# Patient Record
Sex: FEMALE | Race: WHITE | Employment: UNEMPLOYED | URBAN - METROPOLITAN AREA
[De-identification: names, ages, dates, MRNs, and addresses within clinical notes are randomized per-mention and may not be internally consistent; named-entity substitution may affect disease eponyms.]

---

## 2022-04-20 ENCOUNTER — HOSPITAL ENCOUNTER (OUTPATIENT)
Dept: PHYSICAL THERAPY | Age: 57
Discharge: HOME | End: 2022-04-20
Payer: MEDICARE

## 2022-04-20 PROCEDURE — 97110 THERAPEUTIC EXERCISES: CPT

## 2022-04-20 PROCEDURE — 97162 PT EVAL MOD COMPLEX 30 MIN: CPT

## 2022-04-20 PROCEDURE — 97140 MANUAL THERAPY 1/> REGIONS: CPT
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
## 2022-04-20 NOTE — PROGRESS NOTES
Request for use of Dry Needling/Intramuscular Manual Therapy  Patient: Abdon Turner     Referral Source: Marshall Soni MD  Diagnosis: neck pain       : 1965  Date of initial visit: 22   Attended visits: 1  Missed Visits: 0    Based on findings from the physical therapy examination and evaluation, the evaluating therapist believes the patient, Abdon Turner  would benefit from including Dry Needling as part of the plan of care. Dry needling is a treatment technique utilized in conjunction with other PT interventions to inactivate myofascial trigger points and the pain and dysfunction they cause. Dry Needling is an advanced procedure that requires additional training including greater than 54 hours of intensive course work. Physical Therapists at [address] [place] [address] are certified through [address] [place] [address] courses for their education and are certified to perform treatments. PROCEDURE:   Solid filament sterile needle (typically 0.3mm/30 gauge) inserted into a trigger point   Repeated movements inactivate the trigger points, taking 30-60 seconds per site   Typically consists of 1 dry needling session per week and a possible second treatment including muscle re-education, flexibility, strengthening and other manual techniques to facilitate the benefits of dry needling     BENEFITS:   Inactivation of trigger points   Decreased pain   Increased muscle length   Improved movement patterns   Restoration of function POTENTIAL RISKS:   Post-needling soreness   Infection   Bruising/bleeding   Penetration of a nerve   Pneumothorax   All treating PTs have been thoroughly educated in avoiding adverse reactions    If you agree with this recommendation, please sign this form and fax it to us at [phone]. If you have questions or concerns regarding dry needling or any other treatment we may be providing, please contact us at [phone].     Thank you for allowing us to assist in the care of your patient. Radha Susan, PT    4/20/2022 11:38 AM     NOTE TO PHYSICIAN:  PLEASE COMPLETE THE ORDERS BELOW AND   FAX TO [facility]: [phone]  If you are unable to process this request in 24 hours please contact our office:   [phone] I have read the above request and AGREE to the recommendation of including dry needling as part of the plan of care. ? I have read the above request and DO NOT AGREE to including dry needling as part of the plan of care.   ? I have read the above report and request that my patient continue therapy with the following changes/special instructions:  ________________________________________________________________________    Physicians signature: _______________________________________________     Date: ______________Time:_______________
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
## 2022-04-26 ENCOUNTER — HOSPITAL ENCOUNTER (OUTPATIENT)
Dept: PHYSICAL THERAPY | Age: 57
Discharge: HOME | End: 2022-04-26
Payer: MEDICARE

## 2022-04-26 PROCEDURE — 97140 MANUAL THERAPY 1/> REGIONS: CPT

## 2022-04-26 PROCEDURE — 97112 NEUROMUSCULAR REEDUCATION: CPT

## 2022-04-26 PROCEDURE — 97110 THERAPEUTIC EXERCISES: CPT

## 2022-04-26 NOTE — PROGRESS NOTES
PT DAILY TREATMENT NOTE - [facility]     Patient Name: Yuliana Castellanos  Date:2022  : 1965  [x]  Patient  Verified  Payor: VA MEDICARE / Plan: VA MEDICARE PART A & B / Product Type: Medicare /    In time:10:50  Out time:11:50  Total Treatment Time (min): 60  Total Timed Codes (min): 45  1:1 Treatment Time ([facility] only): 40   Visit #: 2 of     Treatment Area: neck pain     SUBJECTIVE  Pain Level IN:(0-10 scale): 1/10  Pain Level OUT: (0-10 scale) post treatment: 0/10    Any medication changes, allergies to medications, adverse drug reactions, diagnosis change, or new procedure performed?: [x] No    [] Yes (see summary sheet for update)  Subjective functional status/changes:   [] No changes reported  When I turn my head to the right I get this pain that seems like it travels from my neck and head on the right side to my right low back   OBJECTIVE    Modalities Rationale:     decrease edema, decrease inflammation, decrease pain and increase tissue extensibility to improve patient's ability to increase cervical ROM with less pain    min [] Estim, type/location:                                      []  att     []  unatt     []  w/US     []  w/ice    []  w/heat    min []  Mechanical Traction: type/lbs                   []  pro   []  sup   []  int   []  cont    []  before manual    []  after manual    min []  Ultrasound, settings/location:      min []  Iontophoresis w/ dexamethasone, location:                                               []  take home patch       []  in clinic   12+3 set up min []  Ice     [x]  Heat    location/position:  To cervical and scapula     min []  Vasopneumatic Device, press/temp:     min []  Other:    [] Skin assessment post-treatment (if applicable):    []  intact    []  redness- no adverse reaction     []redness - adverse reaction:        12 min Therapeutic Exercise:  [x] See flow sheet : first follow up visit since initial evaluation - initiated POC per flow sheet US at bedside.      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 Patient is a candidate for cupping and without contraindications at this time. Patient was instructed in the indications/contraindications of cupping technique. The patient was educated in its purpose and risks/benefits. Patient was advised that redness is normal after technique is performed and that redness will disappear typically within one week.  Dynamic cupping technique performed      Objective/Functional Measures with Therapist Assessment Noted with Response to Therapy Session:   first follow up visit since initial evaluation - initiated POC per flow sheet         ASSESSMENT/Changes in Function:  Patient presented with painful and limited cervical rotation to the (R) to the CT junction, performed MET in sitting and DTM and TPR to CT junction, UT, LS and mid border of scap with good relieve of pain with manual release attempted cupping to also assist with muscle restriction and trigger point. Will assess the effects of today's treatment next visit  Patient will continue to benefit from skilled PT services to modify and progress therapeutic interventions, address functional mobility deficits, address ROM deficits, address strength deficits, analyze and address soft tissue restrictions, analyze and cue movement patterns, analyze and modify body mechanics/ergonomics and assess and modify postural abnormalities to attain remaining goals. [x]  See Plan of Care  []  See progress note/recertification  []  See Discharge Summary         Progress towards goals / Updated goals:  Short Term Goals: To be accomplished in 1 weeks:  1. Patient will be independent and compliant with HEP in order to progress toward long term goals. Status at last note/certification: issued and reviewed   Long Term Goals: To be accomplished in 24-36 treatments:  1. Patient will improve FOTO assessment score to 66 pts in order to indicate improved functional abilities. Status at last note/certification: 61 pts  2. Patient will improve cervical Right rotation AROM to at least 60 deg without increased pain in order to improve ease of turning head while driving. Status at last note/certification: 50 deg, painful  3. Patient will improve bilateral scapular strength to 5/5 in order to improve stability with recreational activities and work tasks. Status at last note/certification: 4-/5 bilaterally  4. Patient will report worst neck/Right shoulder pain as 4/10 or less in order to improve quality of sleep. Status at last note/certification: 60/53  5. Patient will report overall at least 65% improvement in functional abilities in order to progress toward premorbid activities.   Status at last note/certification: n/a    PLAN  [x]  Upgrade activities as tolerated [x]  Continue plan of care  []  Update interventions per flow sheet       []  Discharge due to:_  []  Other:_      Sonia Thakkar, OLIVIA 4/26/2022  10:54 AM    No future appointments.

## 2022-05-10 ENCOUNTER — HOSPITAL ENCOUNTER (OUTPATIENT)
Dept: PHYSICAL THERAPY | Age: 57
Discharge: HOME | End: 2022-05-10
Payer: MEDICARE

## 2022-05-10 PROCEDURE — 97140 MANUAL THERAPY 1/> REGIONS: CPT

## 2022-05-10 PROCEDURE — 97112 NEUROMUSCULAR REEDUCATION: CPT

## 2022-05-10 PROCEDURE — 97110 THERAPEUTIC EXERCISES: CPT

## 2022-05-10 NOTE — PROGRESS NOTES
PT DAILY TREATMENT NOTE - [facility]     Patient Name: Shad Dobson  Date:5/10/2022  : 1965  [x]  Patient  Verified  Payor: [place] MEDICARE / Plan: [place] MEDICARE PART A & B / Product Type: Medicare /    In time:9:15  Out time:10:20  Total Treatment Time (min): 65  Total Timed Codes (min): 52  1:1 Treatment Time ( [address] only): 52  Visit #: 3 of     Treatment Area: Neck pain [M54.2]  Pain in right shoulder [M25.511]    SUBJECTIVE  Pain Level IN:(0-10 scale): 2-3/10  Pain Level OUT: (0-10 scale) post treatment: 10    Any medication changes, allergies to medications, adverse drug reactions, diagnosis change, or new procedure performed?: [x] No    [] Yes (see summary sheet for update)  Subjective functional status/changes:   [] No changes reported  I did good with my hiking in [place], hiked a total of 30 miles and I had wear a back pack that weighed 48 pounds for 8 hours for 2 days and I think with the pressure of the back pack and you cupping it made it feel better   OBJECTIVE    Modalities Rationale:     decrease edema, decrease inflammation, decrease pain and increase tissue extensibility to improve patient's ability to increase cervical ROM with less pain    min [] Estim, type/location:                                      []  att     []  unatt     []  w/US     []  w/ice    []  w/heat    min []  Mechanical Traction: type/lbs                   []  pro   []  sup   []  int   []  cont    []  before manual    []  after manual    min []  Ultrasound, settings/location:      min []  Iontophoresis w/ dexamethasone, location:                                               []  take home patch       []  in clinic   10+3 set up min []  Ice     [x]  Heat    location/position:  To cervical and scapula     min []  Vasopneumatic Device, press/temp:     min []  Other:    [] Skin assessment post-treatment (if applicable):    []  intact    []  redness- no adverse reaction     []redness - adverse reaction:        12 min Therapeutic Exercise:  [x] See flow sheet :   Prone chin tuck with retraction  Added cervical rotation self stretch with use of towel   Rationale: increase ROM and increase strength to improve the patients ability to achieve all goals stated below      min Therapeutic Activity:  [x]  See flow sheet :   Rationale: increase ROM and increase strength  to improve the patients ability to drive car with improved cervical ROM to check blind spots      20 min Neuromuscular Re-education:  [x]  See flow sheet :  Scab stab with green t-band   Prone M, T and single arm Y   Prone single arm row with ER      Rationale: increase ROM and increase strength  to improve the patients ability to perform all daily activities and 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 Patient is a candidate for cupping and without contraindications at this time. Patient was instructed in the indications/contraindications of cupping technique. The patient was educated in its purpose and risks/benefits. Patient was advised that redness is normal after technique is performed and that redness will disappear typically within one week. Dynamic cupping technique performed      Objective/Functional Measures with Therapist Assessment Noted with Response to Therapy Session:    Added :Prone chin tuck with retraction   cervical rotation self stretch with use of towel  AROM with rotation after manual 72 (R) and 60 (L)     2. Patient will improve cervical Right rotation AROM to at least 60 deg without increased pain in order to improve ease of turning head while driving. Status at last note/certification: AROM with rotation after manual 72 (R) and 60 (L)  5/10/2022    ASSESSMENT/Changes in Function:  Patient responsed well with MET to correct extension and flexion rotation lesion to the (R) cervical spine with increase active (R) rotation   Patient will continue to benefit from skilled PT services to modify and progress therapeutic interventions, address functional mobility deficits, address ROM deficits, address strength deficits, analyze and address soft tissue restrictions, analyze and cue movement patterns, analyze and modify body mechanics/ergonomics and assess and modify postural abnormalities to attain remaining goals. [x]  See Plan of Care  []  See progress note/recertification  []  See Discharge Summary         Progress towards goals / Updated goals:  Short Term Goals: To be accomplished in 1 weeks:  1. Patient will be independent and compliant with HEP in order to progress toward long term goals. Status at last note/certification: issued and reviewed   Long Term Goals: To be accomplished in 24-36 treatments:  1. Patient will improve FOTO assessment score to 66 pts in order to indicate improved functional abilities. Status at last note/certification: 61 pts  2. Patient will improve cervical Right rotation AROM to at least 60 deg without increased pain in order to improve ease of turning head while driving. Status at last note/certification: AROM with rotation after manual 72 (R) and 60 (L)  5/10/2022  3. Patient will improve bilateral scapular strength to 5/5 in order to improve stability with recreational activities and work tasks. Status at last note/certification: 4-/5 bilaterally  4. Patient will report worst neck/Right shoulder pain as 4/10 or less in order to improve quality of sleep.   Status at last note/certification: 70/19  5. Patient will report overall at least 65% improvement in functional abilities in order to progress toward premorbid activities.   Status at last note/certification: n/a    PLAN  [x]  Upgrade activities as tolerated     [x]  Continue plan of care  []  Update interventions per flow sheet       []  Discharge due to:_  []  Other:_      Emeka Zaman PTA 5/10/2022  10:54 AM    Future Appointments   Date Time Provider Aracelis Fulton   5/13/2022 10:45 AM Anat Monteiro PTA [facility]   5/17/2022 10:00 AM Anat Monteiro PTA [facility]   5/19/2022 10:00 AM Anat Monteiro, PTA [facility]

## 2022-05-13 ENCOUNTER — HOSPITAL ENCOUNTER (OUTPATIENT)
Dept: PHYSICAL THERAPY | Age: 57
Discharge: HOME | End: 2022-05-13
Payer: MEDICARE

## 2022-05-13 PROCEDURE — 97112 NEUROMUSCULAR REEDUCATION: CPT

## 2022-05-13 PROCEDURE — 97110 THERAPEUTIC EXERCISES: CPT

## 2022-05-13 PROCEDURE — 97140 MANUAL THERAPY 1/> REGIONS: CPT

## 2022-05-13 NOTE — PROGRESS NOTES
PT DAILY TREATMENT NOTE - [facility]     Patient Name: Huma Barakat  Date:2022  : 1965  [x]  Patient  Verified  Payor: [place] MEDICARE / Plan: [place] MEDICARE PART A & B / Product Type: Medicare /    In time:10:55 Out time:11:59  Total Treatment Time (min): 64  Total Timed Codes (min): 54  1:1 Treatment Time ( [address] only): 52  Visit #: 4 of     Treatment Area: Neck pain [M54.2]  Pain in right shoulder [M25.511]    SUBJECTIVE  Pain Level IN:(0-10 scale): 3/10  Pain Level OUT: (0-10 scale) post treatment: 0/10    Any medication changes, allergies to medications, adverse drug reactions, diagnosis change, or new procedure performed?: [x] No    [] Yes (see summary sheet for update)  Subjective functional status/changes:   [] No changes reported  I have a really bad migraine today it started 2 days ago so that makes my neck tighten up     OBJECTIVE    Modalities Rationale:     decrease edema, decrease inflammation, decrease pain and increase tissue extensibility to improve patient's ability to increase cervical ROM with less pain    min [] Estim, type/location:                                      []  att     []  unatt     []  w/US     []  w/ice    []  w/heat    min []  Mechanical Traction: type/lbs                   []  pro   []  sup   []  int   []  cont    []  before manual    []  after manual    min []  Ultrasound, settings/location:      min []  Iontophoresis w/ dexamethasone, location:                                               []  take home patch       []  in clinic   10 min []  Ice     [x]  Heat    location/position:  To cervical and scapula     min []  Vasopneumatic Device, press/temp:     min []  Other:    [] Skin assessment post-treatment (if applicable):    []  intact    []  redness- no adverse reaction     []redness - adverse reaction:        14 min Therapeutic Exercise:  [x] See flow sheet :   Prone chin tuck with retraction  Door way stretch  UBE   Rationale: increase ROM and increase strength to improve the patients ability to achieve all goals stated below      min Therapeutic Activity:  [x]  See flow sheet :   Rationale: increase ROM and increase strength  to improve the patients ability to drive car with improved cervical ROM to check blind spots      20 min Neuromuscular Re-education:  [x]  See flow sheet :  Scab stab with green t-band   Prone M, T and single arm Y   Prone single arm row with ER      Rationale: increase ROM and increase strength  to improve the patients ability to perform all daily activities and 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 Patient is a candidate for cupping and without contraindications at this time. Patient was instructed in the indications/contraindications of cupping technique. The patient was educated in its purpose and risks/benefits. Patient was advised that redness is normal after technique is performed and that redness will disappear typically within one week. Dynamic cupping technique performed      Objective/Functional Measures with Therapist Assessment Noted with Response to Therapy Session:  Did not progress with exercise program today  secondary to patient arrived late and patient report a migraine      ASSESSMENT/Changes in Function:   Patient presented with painful rotation lesion to (L) side of c-spine today compare to the (R) side from C2-6.  Was able to correct with MET for extension and flexion lesion. Patient responded well to active release to the (R) UT/SCM/scalenus with improved (L) cervical rotation. Patient will continue to benefit from skilled PT services to modify and progress therapeutic interventions, address functional mobility deficits, address ROM deficits, address strength deficits, analyze and address soft tissue restrictions, analyze and cue movement patterns, analyze and modify body mechanics/ergonomics and assess and modify postural abnormalities to attain remaining goals. [x]  See Plan of Care  []  See progress note/recertification  []  See Discharge Summary         Progress towards goals / Updated goals:  Short Term Goals: To be accomplished in 1 weeks:  1. Patient will be independent and compliant with HEP in order to progress toward long term goals. Status at last note/certification: issued and reviewed   Long Term Goals: To be accomplished in 24-36 treatments:  1. Patient will improve FOTO assessment score to 66 pts in order to indicate improved functional abilities. Status at last note/certification: 61 pts  2. Patient will improve cervical Right rotation AROM to at least 60 deg without increased pain in order to improve ease of turning head while driving. Status at last note/certification: AROM with rotation after manual 72 (R) and 60 (L)  5/10/2022  3. Patient will improve bilateral scapular strength to 5/5 in order to improve stability with recreational activities and work tasks. Status at last note/certification: 4-/5 bilaterally  4. Patient will report worst neck/Right shoulder pain as 4/10 or less in order to improve quality of sleep. Status at last note/certification: 04/40  5. Patient will report overall at least 65% improvement in functional abilities in order to progress toward premorbid activities.   Status at last note/certification: n/a    PLAN  [x]  Upgrade activities as tolerated     [x]  Continue plan of care  []  Update interventions per flow sheet []  Discharge due to:_  []  Other:_      Teri Medrano PTA 5/13/2022  10:54 AM    Future Appointments   Date Time Provider Aracelis Fulton   5/17/2022 10:00 AM Charlott Miser [facility]   5/19/2022 10:00 AM Carie Clemens PTA [facility]

## 2022-05-17 ENCOUNTER — HOSPITAL ENCOUNTER (OUTPATIENT)
Dept: PHYSICAL THERAPY | Age: 57
Discharge: HOME | End: 2022-05-17
Payer: MEDICARE

## 2022-05-17 PROCEDURE — 97110 THERAPEUTIC EXERCISES: CPT

## 2022-05-17 PROCEDURE — 97112 NEUROMUSCULAR REEDUCATION: CPT

## 2022-05-17 PROCEDURE — 97140 MANUAL THERAPY 1/> REGIONS: CPT

## 2022-05-17 NOTE — PROGRESS NOTES
PT DAILY TREATMENT NOTE - [facility]     Patient Name: Jeannie Johnston  Date:2022  : 1965  [x]  Patient  Verified  Payor: [place] MEDICARE / Plan: [place] MEDICARE PART A & B / Product Type: Medicare /    In time:10:02 Out time:11:13  Total Treatment Time (min): 71  Total Timed Codes (min): 61  1:1 Treatment Time ( [address] only): 64  Visit #: 5 of     Treatment Area: Neck pain [M54.2]  Pain in right shoulder [M25.511]    SUBJECTIVE  Pain Level IN:(0-10 scale): 4-5/10  Pain Level OUT: (0-10 scale) post treatment: 0/10    Any medication changes, allergies to medications, adverse drug reactions, diagnosis change, or new procedure performed?: [x] No    [] Yes (see summary sheet for update)  Subjective functional status/changes:   [] No changes reported  When I am keep my neck still no pain but when I side bend left and right I feel pull on the opposite side of the motion     OBJECTIVE    Modalities Rationale:     decrease edema, decrease inflammation, decrease pain and increase tissue extensibility to improve patient's ability to increase cervical ROM with less pain    min [] Estim, type/location:                                      []  att     []  unatt     []  w/US     []  w/ice    []  w/heat    min []  Mechanical Traction: type/lbs                   []  pro   []  sup   []  int   []  cont    []  before manual    []  after manual    min []  Ultrasound, settings/location:      min []  Iontophoresis w/ dexamethasone, location:                                               []  take home patch       []  in clinic   10 min []  Ice     [x]  Heat    location/position:  To cervical and scapula     min []  Vasopneumatic Device, press/temp:     min []  Other:    [] Skin assessment post-treatment (if applicable):    []  intact    []  redness- no adverse reaction     []redness - adverse reaction:        21 min Therapeutic Exercise:  [x] See flow sheet :   Prone chin tuck with retraction  Door way stretch  UBE  full cans with #3   Rationale: increase ROM and increase strength to improve the patients ability to achieve all goals stated below      min Therapeutic Activity:  [x]  See flow sheet :   Rationale: increase ROM and increase strength  to improve the patients ability to drive car with improved cervical ROM to check blind spots      20 min Neuromuscular Re-education:  [x]  See flow sheet :  Scab stab with green t-band   Prone M, T and single arm Y   Prone single arm row with ER  wall \"v\" with #2  Added Open book against wall with green t-band       Rationale: increase ROM and increase strength  to improve the patients ability to perform all daily activities and 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 Patient is a candidate for cupping and without contraindications at this time. Patient was instructed in the indications/contraindications of cupping technique. The patient was educated in its purpose and risks/benefits. Patient was advised that redness is normal after technique is performed and that redness will disappear typically within one week. Dynamic cupping technique performed      Objective/Functional Measures with Therapist Assessment Noted with Response to Therapy Session:  Added full cans with #3  Added wall \"v\" with #2  Added Open book against wall with green t-band     GOALS  5.  Patient will report overall at least 65% improvement in functional abilities in order to progress toward premorbid activities. Status at last note/certification: patient reports 35% improvement 5/17/2022    ASSESSMENT/Changes in Function:  Patient reports with side bending to the (L) - increase pulling to the (R) cervical and upper trap area and with side bending to the (R) with increase pain to the cervical spine and into the head to the (L) side   After manual and active release to both sides patient was able to perform active cervical side bending with less pulling and pain report, visible increase motion noted as well   Patient will continue to benefit from skilled PT services to modify and progress therapeutic interventions, address functional mobility deficits, address ROM deficits, address strength deficits, analyze and address soft tissue restrictions, analyze and cue movement patterns, analyze and modify body mechanics/ergonomics and assess and modify postural abnormalities to attain remaining goals. [x]  See Plan of Care  []  See progress note/recertification  []  See Discharge Summary         Progress towards goals / Updated goals:  Short Term Goals: To be accomplished in 1 weeks:  1. Patient will be independent and compliant with HEP in order to progress toward long term goals. Status at last note/certification: issued and reviewed   Long Term Goals: To be accomplished in 24-36 treatments:  1. Patient will improve FOTO assessment score to 66 pts in order to indicate improved functional abilities. Status at last note/certification: 61 pts  2. Patient will improve cervical Right rotation AROM to at least 60 deg without increased pain in order to improve ease of turning head while driving. Status at last note/certification: AROM with rotation after manual 72 (R) and 60 (L)  5/10/2022  3. Patient will improve bilateral scapular strength to 5/5 in order to improve stability with recreational activities and work tasks.   Status at last note/certification: 4-/5 bilaterally  4. Patient will report worst neck/Right shoulder pain as 4/10 or less in order to improve quality of sleep. Status at last note/certification: 20/46  5. Patient will report overall at least 65% improvement in functional abilities in order to progress toward premorbid activities.   Status at last note/certification: patient reports 35% improvement 5/17/2022    PLAN  [x]  Upgrade activities as tolerated     [x]  Continue plan of care  []  Update interventions per flow sheet       []  Discharge due to:_  []  Other:_      Alma Delia Jones PTA 5/17/2022  10:54 AM    Future Appointments   Date Time Provider Aracelis Fulton   5/19/2022 10:00 AM Bereket Holcomb PTA [facility]

## 2022-05-19 ENCOUNTER — HOSPITAL ENCOUNTER (OUTPATIENT)
Dept: PHYSICAL THERAPY | Age: 57
Discharge: HOME | End: 2022-05-19
Payer: MEDICARE

## 2022-05-19 PROCEDURE — 97110 THERAPEUTIC EXERCISES: CPT

## 2022-05-19 PROCEDURE — 97112 NEUROMUSCULAR REEDUCATION: CPT

## 2022-05-19 PROCEDURE — 97140 MANUAL THERAPY 1/> REGIONS: CPT

## 2022-05-19 NOTE — PROGRESS NOTES
PT DAILY TREATMENT NOTE - [facility]     Patient Name: Abdon Turner  Date:2022  : 1965  [x]  Patient  Verified  Payor: [place] MEDICARE / Plan: [place] MEDICARE PART A & B / Product Type: Medicare /    In time:10:00 Out time:11:14  Total Treatment Time (min): 74  Total Timed Codes (min): 59  1:1 Treatment Time ( [address] only): 46  Visit #: 6 of     Treatment Area: Neck pain [M54.2]  Pain in right shoulder [M25.511]    SUBJECTIVE  Pain Level IN:(0-10 scale): 610  Pain Level OUT: (0-10 scale) post treatment: 3/10    Any medication changes, allergies to medications, adverse drug reactions, diagnosis change, or new procedure performed?: [x] No    [] Yes (see summary sheet for update)  Subjective functional status/changes:   [] No changes reported  I am really sore and in pain after I have therapy     OBJECTIVE    Modalities Rationale:     decrease edema, decrease inflammation, decrease pain and increase tissue extensibility to improve patient's ability to increase cervical ROM with less pain    min [] Estim, type/location:                                      []  att     []  unatt     []  w/US     []  w/ice    []  w/heat    min []  Mechanical Traction: type/lbs                   []  pro   []  sup   []  int   []  cont    []  before manual    []  after manual    min []  Ultrasound, settings/location:      min []  Iontophoresis w/ dexamethasone, location:                                               []  take home patch       []  in clinic   12+3 setup min []  Ice     [x]  Heat    location/position:  To cervical and scapula     min []  Vasopneumatic Device, press/temp:     min []  Other:    [] Skin assessment post-treatment (if applicable):    []  intact    []  redness- no adverse reaction     []redness - adverse reaction:        19 min Therapeutic Exercise:  [x] See flow sheet :   Prone chin tuck with retraction  Door way stretch  UBE  full cans with #3  Added arnold press with #2   Rationale: increase ROM and increase strength to improve the patients ability to achieve all goals stated below      min Therapeutic Activity:  [x]  See flow sheet :   Rationale: increase ROM and increase strength  to improve the patients ability to drive car with improved cervical ROM to check blind spots      20 min Neuromuscular Re-education:  [x]  See flow sheet :  Scab stab with green t-band   Prone M, T and single arm Y   Prone single arm row with ER  wall \"v\" with #2  Open book against wall with green t-band  Added wall push up with orange swiss ball        Rationale: increase ROM and increase strength  to improve the patients ability to perform all daily activities and 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     20 min Manual Therapy: In sitting active release to the (L) UT/SCM/scalenus and in supine (R) active release   In supine MET for (B) extension and flexion rotation lesion to C2-6   SOR     Rationale: decrease pain, increase ROM, increase tissue extensibility, decrease trigger points and increase postural awareness to cervical and right shoulder               With   [] TE   [] TA   [] neuro   [] other: Patient Education: [x] Review HEP    [] Progressed/Changed HEP based on:   [] positioning   [] body mechanics   [] transfers   [] heat/ice application    [] other:  IASTM performed. Patient is a candidate for cupping and without contraindications at this time. Patient was instructed in the indications/contraindications of cupping technique. The patient was educated in its purpose and risks/benefits. Patient was advised that redness is normal after technique is performed and that redness will disappear typically within one week.  Dynamic cupping technique performed      Objective/Functional Measures with Therapist Assessment Noted with Response to Therapy Session:  Added wall push up with orange swiss ball   Added arnold press with #2      ASSESSMENT/Changes in Function:  Improved mobility noted to the cervical spine with decrease flexion/extension rotation lesion to (L) and (R) side with less pain reported with manual to correct these lesions, decrease tightness to the cervical paraspinals and reported relieve of symptoms with SOR - improved passive cervical rotation noted in supine with only slight tightness stated over the (L) UT area  Patient will continue to benefit from skilled PT services to modify and progress therapeutic interventions, address functional mobility deficits, address ROM deficits, address strength deficits, analyze and address soft tissue restrictions, analyze and cue movement patterns, analyze and modify body mechanics/ergonomics and assess and modify postural abnormalities to attain remaining goals. [x]  See Plan of Care  []  See progress note/recertification  []  See Discharge Summary         Progress towards goals / Updated goals:  Short Term Goals: To be accomplished in 1 weeks:  1. Patient will be independent and compliant with HEP in order to progress toward long term goals. Status at last note/certification: issued and reviewed   Long Term Goals: To be accomplished in 24-36 treatments:  1. Patient will improve FOTO assessment score to 66 pts in order to indicate improved functional abilities. Status at last note/certification: 61 pts  2. Patient will improve cervical Right rotation AROM to at least 60 deg without increased pain in order to improve ease of turning head while driving. Status at last note/certification: AROM with rotation after manual 72 (R) and 60 (L)  5/10/2022  3. Patient will improve bilateral scapular strength to 5/5 in order to improve stability with recreational activities and work tasks. Status at last note/certification: 4-/5 bilaterally  4. Patient will report worst neck/Right shoulder pain as 4/10 or less in order to improve quality of sleep. Status at last note/certification: 74/28  5.  Patient will report overall at least 65% improvement in functional abilities in order to progress toward premorbid activities.   Status at last note/certification: patient reports 35% improvement 5/17/2022    PLAN  [x]  Upgrade activities as tolerated     [x]  Continue plan of care  []  Update interventions per flow sheet       []  Discharge due to:_  []  Other:_      Uri Pond PTA 5/19/2022  10:54 AM    Future Appointments   Date Time Provider Aracelis Fulton   5/23/2022 11:30 AM Jennifer Miller PT [facility]   5/26/2022 10:45 AM Claribel Guzman PTA [facility]   6/3/2022 11:30 AM Claribel Guzman, PTA [facility]   6/7/2022 10:45 AM Beaverdam Mast PTA [facility]

## 2022-05-23 ENCOUNTER — HOSPITAL ENCOUNTER (OUTPATIENT)
Dept: PHYSICAL THERAPY | Age: 57
Discharge: HOME | End: 2022-05-23
Payer: MEDICARE

## 2022-05-23 PROCEDURE — 97140 MANUAL THERAPY 1/> REGIONS: CPT

## 2022-05-23 PROCEDURE — 97110 THERAPEUTIC EXERCISES: CPT
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
## 2022-05-23 NOTE — PROGRESS NOTES
PT DAILY TREATMENT NOTE     Patient Name: Marcio Meier  Date:2022  : 1965  [x]  Patient  Verified  Payor: VA MEDICARE / Plan: VA MEDICARE PART A & B / Product Type: Medicare /    In [id number]  Out time:1229  Total Treatment Time (min): 57  Visit #: 7 of     Medicare/BCBS Only   Total Timed Codes (min):  57 1:1 Treatment Time:  57       Treatment Area: Neck pain [M54.2]  Pain in right shoulder [M25.511]    SUBJECTIVE  Pain Level (0-10 scale): 1-2/10  Any medication changes, allergies to medications, adverse drug reactions, diagnosis change, or new procedure performed?: [x] No    [] Yes (see summary sheet for update)  Subjective functional status/changes:   [] No changes reported  I'm feeling pretty good. I've changed the way I've been working out, [employer] using the [employer] at home instead of [facility].      OBJECTIVE    Modality rationale: decrease pain and increase tissue extensibility to improve the patients ability to improve cervical mobility and reduce soreness after exercises/manual    Min Type Additional Details    [] Estim:  []Unatt       []IFC  []Premod                        []Other:  []w/ice   []w/heat  Position:  Location:    [] Estim: []Att    []TENS instruct  []NMES                    []Other:  []w/US   []w/ice   []w/heat  Position:  Location:    []  Traction: [] Cervical       []Lumbar                       [] Prone          []Supine                       []Intermittent   []Continuous Lbs:  [] before manual  [] after manual    []  Ultrasound: []Continuous   [] Pulsed                           []1MHz   []3MHz W/cm2:  Location:    []  Iontophoresis with dexamethasone         Location: [] Take home patch   [] In clinic   PD []  Ice     []  heat  []  Ice massage  []  Laser   []  Anodyne Position:  Location:    []  Laser with stim  []  Other:  Position:  Location:    []  Vasopneumatic Device    []  Right     []  Left  Pre-treatment girth:  Post-treatment girth:  Measured at (location):  Pressure:       [] lo [] med [] hi   Temperature: [] lo [] med [] hi   [] Skin assessment post-treatment:  []intact []redness- no adverse reaction    []redness - adverse reaction:     Vasopnuematic compression justification:  Per bilateral girth measures taken and listed above the edema is considered significant and having an impact on the patient's strength and gait/posture     32 min Therapeutic Exercise:  [] See flow sheet :  FOTO  Reassessment  Goal reassessment    Hold:   Prone chin tuck with retraction  Door way stretch  UBE  full cans with #3  Added arnold press with #2   Rationale: increase ROM and increase strength to improve the patients ability to perform ADLs, job tasks     x min Therapeutic Activity:  [x]  See flow sheet :   Rationale: increase ROM and increase strength  to improve the patients ability to drive car with improved cervical ROM to check blind spots     hold min Neuromuscular Re-education:  [x]  See flow sheet :  Scab stab with green t-band   Prone M, T and single arm Y   Prone single arm row with ER  wall \"v\" with #2  Open book against wall with green t-band  Added wall push up with orange swiss ball     Rationale: increase ROM, increase strength, improve coordination and increase proprioception  to improve the patients ability to improve cervical, scapular stability with functional tasks     25 min Manual Therapy:  Prone STM/TPR to bilateral UT, prone STM/TPR to bilateral proximal SCM, supine manual cervical distraction with stretching into rotation/side bending, supine STM to bilateral proximal SCMs    The manual therapy interventions were performed at a separate and distinct time from the therapeutic activities interventions.   Rationale: decrease pain, increase ROM, increase tissue extensibility and decrease trigger points to improve ease of turning head to drive, improve postural awareness with daily tasks      With   [] TE   [] TA   [] neuro   [] other: Patient Education: [x] Review HEP    [] Progressed/Changed HEP based on:   [] positioning   [] body mechanics   [] transfers   [] heat/ice application    [] other:      Other Objective/Functional Measures: seated cervical Rotation AROM: Right 62 deg, Left 56 deg  Prone scapular strength: Left grossly 4-/5, Right middle trapezius 5/5 and lower trapezius 4-/5   Functional Gains: improving ergonomics with painting, more awareness of posture, less severity of pain, less tenderness in the cervical musculature, improving cervical mobility   Functional Deficits: increased neck pain with holding head up with workouts, increased pain with turning head to Left and Right while driving (had to using fingers to put pressure at proximal SCM to improve mobility), increased tightness/stiffness in the mornings, more difficulty turning head to the Left, tension/knot Right UT, scapular strength  % improvement: 70-75%  Pain   Average: 2-3/10       Best: 1/10     Worst: 4/10  Patient Goal: \"learn some better techniques for holding my head up\"    Pain Level (0-10 scale) post treatment: 1/10    ASSESSMENT/Changes in Function: Patient has attended therapy over the past month for neck and Right shoulder pain due to a prior injury, with reports of improving overall muscle flexibility but continued pain and tightness. Patient finds that as she turns her head to either side (such as with driving), she has to put pressure on her neck to reduce tension. She has adjusted her workout style to limit cervical strain, and does note improvements in overall pain levels since eliminating Pilates activities that caused more discomfort. Patient does still have trigger points and pain in the UTs and SCMs, and notes some increased strain with Left cervical rotation whereas a month ago Right rotation was more challenging. Scapular strength remains impaired.  Patient would benefit from continued skilled physical therapy in order to continued to improve cervicoscapular mobility and stability. Patient will continue to benefit from skilled PT services to modify and progress therapeutic interventions, address functional mobility deficits, address ROM deficits, address strength deficits, analyze and address soft tissue restrictions, analyze and cue movement patterns, analyze and modify body mechanics/ergonomics, assess and modify postural abnormalities, address imbalance/dizziness and instruct in home and community integration to attain remaining goals. []  See Plan of Care  []  See progress note/recertification  []  See Discharge Summary         Progress towards goals / Updated goals:  Short Term Goals: To be accomplished in 1 weeks:  1. Patient will be independent and compliant with HEP in order to progress toward long term goals. Status at last note/certification: issued and reviewed   Current: progressing, some compliance 5/23/22  Long Term Goals: To be accomplished in 24-36 treatments:  1. Patient will improve FOTO assessment score to 66 pts in order to indicate improved functional abilities. Status at last note/certification: 61 pts  Current: met, 72 pts 5/23/22  2. Patient will improve cervical Right rotation AROM to at least 60 deg without increased pain in order to improve ease of turning head while driving. Status at last note/certification: 50 deg, painful    Current: progressing, Right 62 deg, Left 56 deg 5/23/22  3. Patient will improve bilateral scapular strength to 5/5 in order to improve stability with recreational activities and work tasks. Status at last note/certification: 4-/5 bilaterally  Current: progressing, Left remains grossly 4-/5, Right MT 5/5 LT 4-/5 5/23/22  4. Patient will report worst neck/Right shoulder pain as 4/10 or less in order to improve quality of sleep. Status at last note/certification: 48/80  Current: met as of this past week, 4/10 5/23/22  5.  Patient will report overall at least 65% improvement in functional abilities in order to progress toward premorbid activities.   Status at last note/certification: n/a  Current: met, 75% 5/23/22    PLAN  [x]  Upgrade activities as tolerated     [x]  Continue plan of care  [x]  Update interventions per flow sheet       []  Discharge due to:_  []  Other:_      Dania Villareal, PT 5/23/2022  10:51 AM    Future Appointments   Date Time Provider Aracelis Fulton   5/23/2022 11:30 AM Jeffry Angel, PT [place] [id number] [address]   5/26/2022 10:45 AM Bereket Holcomb PTA [place] [id number]   6/3/2022 11:30 AM Bereket Holcomb PTA [place] [id number] [address]   6/7/2022 10:45 AM Bereket Holcomb [facility] [address]

## 2022-05-26 ENCOUNTER — APPOINTMENT (OUTPATIENT)
Dept: PHYSICAL THERAPY | Age: 57
End: 2022-05-26
Payer: MEDICARE

## 2022-06-03 ENCOUNTER — HOSPITAL ENCOUNTER (OUTPATIENT)
Dept: PHYSICAL THERAPY | Age: 57
Discharge: HOME | End: 2022-06-03
Payer: MEDICARE

## 2022-06-03 PROCEDURE — 97112 NEUROMUSCULAR REEDUCATION: CPT

## 2022-06-03 PROCEDURE — 97140 MANUAL THERAPY 1/> REGIONS: CPT

## 2022-06-03 NOTE — PROGRESS NOTES
PT DAILY TREATMENT NOTE - [facility]     Patient Name: Yuliana Castellanos  Date:6/3/2022  : 1965  [x]  Patient  Verified  Payor: [place] MED[place] / Plan: [place] MEDICARE PART A & B / Product Type: Medicare /    In time:10:54 Out time:11:48  Total Treatment Time (min): 54  Total Timed Codes (min): 44  1:1 Treatment Time ( [address] only): 44  Visit #: 8 of     Treatment Area: Neck pain [M54.2]  Pain in right shoulder [M25.511]    SUBJECTIVE  Pain Level IN:(0-10 scale): 2/10  Pain Level OUT: (0-10 scale) post treatment: 0/10    Any medication changes, allergies to medications, adverse drug reactions, diagnosis change, or new procedure performed?: [x] No    [] Yes (see summary sheet for update)  Subjective functional status/changes:   [] No changes reported  It is really in between my shoulder blades and right in the center of my lower neck - I can move my head more and I can hear popping and cracking but it doesn't hurt or anything like it did     OBJECTIVE    Modalities Rationale:     decrease edema, decrease inflammation, decrease pain and increase tissue extensibility to improve patient's ability to increase cervical ROM with less pain    min [] Estim, type/location:                                      []  att     []  unatt     []  w/US     []  w/ice    []  w/heat    min []  Mechanical Traction: type/lbs                   []  pro   []  sup   []  int   []  cont    []  before manual    []  after manual    min []  Ultrasound, settings/location:      min []  Iontophoresis w/ dexamethasone, location:                                               []  take home patch       []  in clinic   10 min []  Ice     [x]  Heat    location/position:  To cervical and scapula     min []  Vasopneumatic Device, press/temp:     min []  Other:    [] Skin assessment post-treatment (if applicable):    []  intact    []  redness- no adverse reaction     []redness - adverse reaction:        7 min Therapeutic Exercise:  [x] See flow sheet :   Door way stretch  UBE    TC:  full cans with #3  Added arnold press with #2   Rationale: increase ROM and increase strength to improve the patients ability to achieve all goals stated below      min Therapeutic Activity:  [x]  See flow sheet :   Rationale: increase ROM and increase strength  to improve the patients ability to drive car with improved cervical ROM to check blind spots      12 min Neuromuscular Re-education:  [x]  See flow sheet :  Scab stab with green t-band   Prone M, T and single arm Y   Prone single arm row with ER  wall \"v\" with #2    TC:  Open book against wall with green t-band  Added wall push up with orange swiss ball        Rationale: increase ROM and increase strength  to improve the patients ability to perform all daily activities and 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     25 min Manual Therapy: In prone gentle mobs to the C6 through upper thoracic and TPR and STM to C6-upper thoracic   In supine MET for (B) extension and flexion rotation lesion to CT junction  SOR     Rationale: decrease pain, increase ROM, increase tissue extensibility, decrease trigger points and increase postural awareness to cervical and right shoulder               With   [] TE   [] TA   [] neuro   [] other: Patient Education: [x] Review HEP    [] Progressed/Changed HEP based on:   [] positioning   [] body mechanics   [] transfers   [] heat/ice application    [] other:  IASTM performed. Patient is a candidate for cupping and without contraindications at this time. Patient was instructed in the indications/contraindications of cupping technique. The patient was educated in its purpose and risks/benefits. Patient was advised that redness is normal after technique is performed and that redness will disappear typically within one week.  Dynamic cupping technique performed      Objective/Functional Measures with Therapist Assessment Noted with Response to Therapy Session:  TC with some exercises and activities due to arriving late    GOALS  3. Patient will report worst neck/Right shoulder pain as 3/10 or less in order to improve quality of sleep. Status at last note/certification: MET 1/01 6/3/2022    ASSESSMENT/Changes in Function:  Patient presents with some rotation restriction to the CT junction that is causing some increase discomfort to the (R) side of the CT junction - responded well with MET and manual - improved PROM of head with rotation left and right noted in supine with less report of pain/restriction felt   Patient will continue to benefit from skilled PT services to modify and progress therapeutic interventions, address functional mobility deficits, address ROM deficits, address strength deficits, analyze and address soft tissue restrictions, analyze and cue movement patterns, analyze and modify body mechanics/ergonomics and assess and modify postural abnormalities to attain remaining goals. [x]  See Plan of Care  []  See progress note/recertification  []  See Discharge Summary         Progress towards goals / Updated goals:5/23/22  Updated Goals to be accomplished in remaining 24 to 36 treatments:  1. Patient will improve bilateral cervical rotation AROM to at least 70 deg without increased pain in order to improve ease of turning head while driving. Status at last note/certification: Right 62 deg, Left 56 deg   2. Patient will improve bilateral scapular strength to 5/5 in order to improve stability with recreational activities and work tasks. Status at last note/certification: Left remains grossly 4-/5, Right MT 5/5 LT 4-/5   3. Patient will report worst neck/Right shoulder pain as 3/10 or less in order to improve quality of sleep.   Status at last note/certification: MET 7/43 6/3/2022    PLAN  [x]  Upgrade activities as tolerated     [x]  Continue plan of care  []  Update interventions per flow sheet       []  Discharge due to:_  []  Other:_      Jovan Washington, OLIVIA 6/3/2022  10:54 AM    Future Appointments   Date Time Provider Aracelis Fulton   6/7/2022 10:45 AM Linda Cabrera PTA [facility]

## 2022-06-07 ENCOUNTER — HOSPITAL ENCOUNTER (OUTPATIENT)
Dept: PHYSICAL THERAPY | Age: 57
Discharge: HOME | End: 2022-06-07
Payer: MEDICARE

## 2022-06-07 PROCEDURE — 97110 THERAPEUTIC EXERCISES: CPT

## 2022-06-07 PROCEDURE — 97140 MANUAL THERAPY 1/> REGIONS: CPT

## 2022-06-07 PROCEDURE — 97112 NEUROMUSCULAR REEDUCATION: CPT

## 2022-06-07 NOTE — PROGRESS NOTES
PT DAILY TREATMENT NOTE - [facility]     Patient Name: Drew Healy  Date:2022  : 1965  [x]  Patient  Verified  Payor: [place] MEDICARE / Plan: [place] MEDICARE PART A & B / Product Type: Medicare /    In time:10:50 Out time:11:45  Total Treatment Time (min): 55  Total Timed Codes (min): 45  1:1 Treatment Time ( [address] only): 40  Visit #: 9 of     Treatment Area: Neck pain [M54.2]  Pain in right shoulder [M25.511]    SUBJECTIVE  Pain Level IN:(0-10 scale): 2/10  Pain Level OUT: (0-10 scale) post treatment: 0/10    Any medication changes, allergies to medications, adverse drug reactions, diagnosis change, or new procedure performed?: [x] No    [] Yes (see summary sheet for update)  Subjective functional status/changes:   [] No changes reported  I am alittle bit sore today but I did some gardening over the weekend moving a flower bed and 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     OBJECTIVE    Modalities Rationale:     decrease edema, decrease inflammation, decrease pain and increase tissue extensibility to improve patient's ability to increase cervical ROM with less pain    min [] Estim, type/location:                                      []  att     []  unatt     []  w/US     []  w/ice    []  w/heat    min []  Mechanical Traction: type/lbs                   []  pro   []  sup   []  int   []  cont    []  before manual    []  after manual    min []  Ultrasound, settings/location:      min []  Iontophoresis w/ dexamethasone, location:                                               []  take home patch       []  in clinic   10 min []  Ice     [x]  Heat    location/position:  To cervical and scapula     min []  Vasopneumatic Device, press/temp:     min []  Other:    [] Skin assessment post-treatment (if applicable):    []  intact    []  redness- no adverse reaction     []redness - adverse reaction:        15 min Therapeutic Exercise:  [x] See flow sheet :   Door way stretch  UBE  full cans with #3  Added mitchell press with #3  Lat pulls #30      Rationale: increase ROM and increase strength to improve the patients ability to achieve all goals stated below      min Therapeutic Activity:  [x]  See flow sheet :   Rationale: increase ROM and increase strength  to improve the patients ability to drive car with improved cervical ROM to check blind spots      10 min Neuromuscular Re-education:  [x]  See flow sheet :  Prone M, T   wall \"v\" with #2  Wall clocks with green t-band         Rationale: increase ROM and increase strength  to improve the patients ability to perform all daily activities and 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 Patient is a candidate for cupping and without contraindications at this time. Patient was instructed in the indications/contraindications of cupping technique. The patient was educated in its purpose and risks/benefits. Patient was advised that redness is normal after technique is performed and that redness will disappear typically within one week. Dynamic cupping technique performed      Objective/Functional Measures with Therapist Assessment Noted with Response to Therapy Session:  Added wall clocks with green t-band   Increased arnold press to #3  Increased prone M and T to #3  Added lat pull downs with #30  GOALS  1. Patient will improve bilateral cervical rotation AROM to at least 70 deg without increased pain in order to improve ease of turning head while driving.   Status at last note/certification: Right 62 deg, Left 56 deg   Current: (R) and (L) rotation is 65 with some pulling reported to the (R) in both direction 6/7/2022    ASSESSMENT/Changes in Function:  Patient presented with trigger point and muscle tightness to the (R) UT and LS that responded well with active release, patient did not present with any rotation lesion today with improved cervical ROM   Patient will continue to benefit from skilled PT services to modify and progress therapeutic interventions, address functional mobility deficits, address ROM deficits, address strength deficits, analyze and address soft tissue restrictions, analyze and cue movement patterns, analyze and modify body mechanics/ergonomics and assess and modify postural abnormalities to attain remaining goals. [x]  See Plan of Care  []  See progress note/recertification  []  See Discharge Summary         Progress towards goals / Updated goals:5/23/22  Updated Goals to be accomplished in remaining 24 to 36 treatments:  1. Patient will improve bilateral cervical rotation AROM to at least 70 deg without increased pain in order to improve ease of turning head while driving. Status at last note/certification: Right 62 deg, Left 56 deg   Current: (R) and (L) rotation is 65 with some pulling reported to the (R) in both direction 6/7/2022  2. Patient will improve bilateral scapular strength to 5/5 in order to improve stability with recreational activities and work tasks. Status at last note/certification: Left remains grossly 4-/5, Right MT 5/5 LT 4-/5   3. Patient will report worst neck/Right shoulder pain as 3/10 or less in order to improve quality of sleep.   Status at last note/certification: MET 5/63 6/3/2022    PLAN  [x]  Upgrade activities as tolerated     [x]  Continue plan of care  []  Update interventions per flow sheet       []  Discharge due to:_  []  Other:_      Amy Miranda, OLIVIA 6/7/2022  10:54 AM    Future Appointments   Date Time Provider Aracelis Fulton   6/16/2022 10:00 AM Iosco Agent, PTA [facility]

## 2022-06-16 ENCOUNTER — HOSPITAL ENCOUNTER (OUTPATIENT)
Dept: PHYSICAL THERAPY | Age: 57
Discharge: HOME | End: 2022-06-16
Payer: MEDICARE

## 2022-06-16 PROCEDURE — 97112 NEUROMUSCULAR REEDUCATION: CPT

## 2022-06-16 PROCEDURE — 97110 THERAPEUTIC EXERCISES: CPT

## 2022-06-16 PROCEDURE — 97140 MANUAL THERAPY 1/> REGIONS: CPT

## 2022-06-16 NOTE — PROGRESS NOTES
PT DAILY TREATMENT NOTE - [facility]     Patient Name: Abdon Turner  Date:2022  : 1965  [x]  Patient  Verified  Payor: [place] MEDICARE / Plan: [place] MEDICARE PART A & B / Product Type: Medicare /    In time:10:06Out time:11:08  Total Treatment Time (min): 62  Total Timed Codes (min): 52  1:1 Treatment Time ( [address] only): 52  Visit #: 10 of     Treatment Area: Neck pain [M54.2]  Pain in right shoulder [M25.511]    SUBJECTIVE  Pain Level IN:(0-10 scale): 3/10 tightness   Pain Level OUT: (0-10 scale) post treatment: 0/10    Any medication changes, allergies to medications, adverse drug reactions, diagnosis change, or new procedure performed?: [x] No    [] Yes (see summary sheet for update)  Subjective functional status/changes:   [] No changes reported  I am tight today and I have a bad headache because of the weather     OBJECTIVE    Modalities Rationale:     decrease edema, decrease inflammation, decrease pain and increase tissue extensibility to improve patient's ability to increase cervical ROM with less pain    min [] Estim, type/location:                                      []  att     []  unatt     []  w/US     []  w/ice    []  w/heat    min []  Mechanical Traction: type/lbs                   []  pro   []  sup   []  int   []  cont    []  before manual    []  after manual    min []  Ultrasound, settings/location:      min []  Iontophoresis w/ dexamethasone, location:                                               []  take home patch       []  in clinic   10 min []  Ice     [x]  Heat    location/position:  To cervical and scapula     min []  Vasopneumatic Device, press/temp:     min []  Other:    [] Skin assessment post-treatment (if applicable):    []  intact    []  redness- no adverse reaction     []redness - adverse reaction:        20 min Therapeutic Exercise:  [x] See flow sheet :   Door way stretch  UBE  full cans with #3  Added arnold press with #3  Lat pulls #30      Rationale: increase ROM and increase strength to improve the patients ability to achieve all goals stated below      min Therapeutic Activity:  [x]  See flow sheet :   Rationale: increase ROM and increase strength  to improve the patients ability to drive car with improved cervical ROM to check blind spots      17 min Neuromuscular Re-education:  [x]  See flow sheet :  Prone M, T   wall \"v\" with #2  Wall clocks with green t-band         Rationale: increase ROM and increase strength  to improve the patients ability to perform all daily activities and 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 Patient is a candidate for cupping and without contraindications at this time. Patient was instructed in the indications/contraindications of cupping technique. The patient was educated in its purpose and risks/benefits. Patient was advised that redness is normal after technique is performed and that redness will disappear typically within one week.  Dynamic cupping technique performed      Objective/Functional Measures with Therapist Assessment Noted with Response to Therapy Session:  Secondary to reported headache did not change exercises/activities   Added contract/rela technique to increase cervical rotation (L) and (R)     ASSESSMENT/Changes in Function:  Patient reported increase pain to the (R) UT/LS area with contract/relax technique to increase cervical rotation to the (L), performed active release to the (R) UT to assist with decreasing pain with active left cervical rotation   Patient will continue to benefit from skilled PT services to modify and progress therapeutic interventions, address functional mobility deficits, address ROM deficits, address strength deficits, analyze and address soft tissue restrictions, analyze and cue movement patterns, analyze and modify body mechanics/ergonomics and assess and modify postural abnormalities to attain remaining goals. [x]  See Plan of Care  []  See progress note/recertification  []  See Discharge Summary         Progress towards goals / Updated goals:5/23/22  Updated Goals to be accomplished in remaining 24 to 36 treatments:  1. Patient will improve bilateral cervical rotation AROM to at least 70 deg without increased pain in order to improve ease of turning head while driving. Status at last note/certification: Right 62 deg, Left 56 deg   Current: (R) and (L) rotation is 65 with some pulling reported to the (R) in both direction 6/7/2022  2. Patient will improve bilateral scapular strength to 5/5 in order to improve stability with recreational activities and work tasks. Status at last note/certification: Left remains grossly 4-/5, Right MT 5/5 LT 4-/5   3. Patient will report worst neck/Right shoulder pain as 3/10 or less in order to improve quality of sleep. Status at last note/certification: MET 8/06 6/3/2022    PLAN  [x]  Upgrade activities as tolerated     [x]  Continue plan of care  []  Update interventions per flow sheet       []  Discharge due to:_  []  Other:_      Jovan Washington, OLIVIA 6/16/2022  10:54 AM    No future appointments.

## 2022-06-23 ENCOUNTER — HOSPITAL ENCOUNTER (OUTPATIENT)
Dept: PHYSICAL THERAPY | Age: 57
Discharge: HOME | End: 2022-06-23
Payer: MEDICARE

## 2022-06-23 PROCEDURE — 97110 THERAPEUTIC EXERCISES: CPT | Performed by: PHYSICAL THERAPIST

## 2022-06-23 PROCEDURE — 97140 MANUAL THERAPY 1/> REGIONS: CPT | Performed by: PHYSICAL THERAPIST

## 2022-06-23 PROCEDURE — 97112 NEUROMUSCULAR REEDUCATION: CPT | Performed by: PHYSICAL THERAPIST

## 2022-06-23 NOTE — PROGRESS NOTES
PT DAILY TREATMENT NOTE - [facility]     Patient Name: Jeannie Johnston  Date:2022  : 1965  [x]  Patient  Verified  Payor: [place] MEDICARE / Plan: [place] MEDICARE PART A & B / Product Type: Medicare /    In time:1220pm Out time: 123pm  Total Treatment Time (min): 63min  Total Timed Codes (min): 53  1:1 Treatment Time ([facility] only): 48min  Visit #: 25 of     Treatment Area: Neck pain [M54.2]  Pain in right shoulder [M25.511]    SUBJECTIVE  Pain Level IN:(0-10 scale): 2/10 tightness   Pain Level OUT: (0-10 scale) post treatment: 0/10    Any medication changes, allergies to medications, adverse drug reactions, diagnosis change, or new procedure performed?: [x] No    [] Yes (see summary sheet for update)  Subjective functional status/changes:   [] No changes reported  It is tight on the L UT when I turn L. My R rot is better. OBJECTIVE    Modalities Rationale:     decrease edema, decrease inflammation, decrease pain and increase tissue extensibility to improve patient's ability to increase cervical ROM with less pain    min [] Estim, type/location:                                      []  att     []  unatt     []  w/US     []  w/ice    []  w/heat    min []  Mechanical Traction: type/lbs                   []  pro   []  sup   []  int   []  cont    []  before manual    []  after manual    min []  Ultrasound, settings/location:      min []  Iontophoresis w/ dexamethasone, location:                                               []  take home patch       []  in clinic   10 min []  Ice     [x]  Heat    location/position:  To cervical and scapula     min []  Vasopneumatic Device, press/temp:     min []  Other:    [] Skin assessment post-treatment (if applicable):    []  intact    []  redness- no adverse reaction     []redness - adverse reaction:        19 min Therapeutic Exercise:  [x] See flow sheet :   Door way stretch  UBE  full cans with #3  Added arnold press with #3  Lat pulls #30      Rationale: increase ROM and increase strength to improve the patients ability to achieve all goals stated below      min Therapeutic Activity:  [x]  See flow sheet :   Rationale: increase ROM and increase strength  to improve the patients ability to drive car with improved cervical ROM to check blind spots      11 min Neuromuscular Re-education:  [x]  See flow sheet :  Prone M, T   wall \"v\" with #2  Wall clocks with green t-band         Rationale: increase ROM and increase strength  to improve the patients ability to perform all daily activities and 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     23 min Manual Therapy: In supine active release to UT and LS and TPR to the (R) UT/LS  SOR, SCM MFR, facet mobes: Gorge and central  Contract/relax technique to increase rotation left and right      Rationale: decrease pain, increase ROM, increase tissue extensibility, decrease trigger points and increase postural awareness to cervical and right shoulder               With   [] TE   [] TA   [] neuro   [] other: Patient Education: [x] Review HEP    [] Progressed/Changed HEP based on:   [] positioning   [] body mechanics   [] transfers   [] heat/ice application    [] other:  IASTM performed. Patient is a candidate for cupping and without contraindications at this time. Patient was instructed in the indications/contraindications of cupping technique. The patient was educated in its purpose and risks/benefits. Patient was advised that redness is normal after technique is performed and that redness will disappear typically within one week. Dynamic cupping technique performed      Objective/Functional Measures with Therapist Assessment Noted with Response to Therapy Session:  L c/s rotation: 52 deg, 56 deg at last assessment, 70deg on R. Progressed arnold press to 4# and lat pull to 40#    ASSESSMENT/Changes in Function:  Patient reports pain with L c/s rotation in L facet area, and with c/s extension. TTP at upper c/s facet Gorge.  And with central PA glides, SCM tightness Gorge R>L with TrP noted at mastoid process. Improved R c/s rot, L rot continues to be limited and painful, but improving. Mod cues (verbal and TC) need for ant c/s mm relaxation during manual.     Patient will continue to benefit from skilled PT services to modify and progress therapeutic interventions, address functional mobility deficits, address ROM deficits, address strength deficits, analyze and address soft tissue restrictions, analyze and cue movement patterns, analyze and modify body mechanics/ergonomics and assess and modify postural abnormalities to attain remaining goals. [x]  See Plan of Care  []  See progress note/recertification  []  See Discharge Summary         Progress towards goals / Updated goals:5/23/22  Updated Goals to be accomplished in remaining 24 to 36 treatments:  1. Patient will improve bilateral cervical rotation AROM to at least 70 deg without increased pain in order to improve ease of turning head while driving. Status at last note/certification: Right 62 deg, Left 56 deg   Current: (R) and (L) rotation is 65 with some pulling reported to the (R) in both direction 6/7/2022, L c/s rotation: 52 deg, 56 deg at last assessment, 70deg on R.  6/23/22  2. Patient will improve bilateral scapular strength to 5/5 in order to improve stability with recreational activities and work tasks. Status at last note/certification: Left remains grossly 4-/5, Right MT 5/5 LT 4-/5   3. Patient will report worst neck/Right shoulder pain as 3/10 or less in order to improve quality of sleep.   Status at last note/certification: MET 4/26 6/3/2022    PLAN  [x]  Upgrade activities as tolerated     [x]  Continue plan of care  []  Update interventions per flow sheet       []  Discharge due to:_  []  Other:_      Lorenzo Booker, PT 6/23/2022  10:54 AM    Future Appointments   Date Time Provider Aracelis Fulton   6/23/2022 12:15 PM Nile Faust, PT [facility] [address]   7/14/2022 10:00 AM Madiha Cervantes PTA [facility]

## 2022-07-14 ENCOUNTER — HOSPITAL ENCOUNTER (OUTPATIENT)
Dept: PHYSICAL THERAPY | Age: 57
Discharge: HOME | End: 2022-07-14
Payer: MEDICARE

## 2022-07-14 PROCEDURE — 97530 THERAPEUTIC ACTIVITIES: CPT | Performed by: PHYSICAL THERAPIST

## 2022-07-14 PROCEDURE — 97110 THERAPEUTIC EXERCISES: CPT | Performed by: PHYSICAL THERAPIST

## 2022-07-14 NOTE — PROGRESS NOTES
PT DAILY TREATMENT NOTE - [facility]     Patient Name: Miriam Negron  Date:2022  : 1965  [x]  Patient  Verified  Payor: [facility] MEDIC[facility] / Plan: [place] MEDICARE PART A & B / Product Type: Medicare /    In time:915am Out time: 1003am  Total Treatment Time (min): 48min  Total Timed Codes (min): 48  1:1 Treatment Time ( only): 48min  Visit #: 12 of     Treatment Area: Neck pain [M54.2]  Pain in right shoulder [M25.511]    SUBJECTIVE  Pain Level IN:(0-10 scale): tight/10   Pain Level OUT: (0-10 scale) post treatment: 0/10    Any medication changes, allergies to medications, adverse drug reactions, diagnosis change, or new procedure performed?: [x] No    [] Yes (see summary sheet for update)  Subjective functional status/changes:   [] No changes reported  See DC    OBJECTIVE    Modalities Rationale:     decrease edema, decrease inflammation, decrease pain and increase tissue extensibility to improve patient's ability to increase cervical ROM with less pain    min [] Estim, type/location:                                      []  att     []  unatt     []  w/US     []  w/ice    []  w/heat    min []  Mechanical Traction: type/lbs                   []  pro   []  sup   []  int   []  cont    []  before manual    []  after manual    min []  Ultrasound, settings/location:      min []  Iontophoresis w/ dexamethasone, location:                                               []  take home patch       []  in clinic    min []  Ice     [x]  Heat    location/position:  To cervical and scapula     min []  Vasopneumatic Device, press/temp:     min []  Other:    [] Skin assessment post-treatment (if applicable):    []  intact    []  redness- no adverse reaction     []redness - adverse reaction:        15 min Therapeutic Exercise:  [x] See flow sheet :   Door way stretch  UBE  full cans with #3  Added arnold press with #3  Lat pulls #30      Rationale: increase ROM and increase strength to improve the patients ability to achieve all goals stated below     33 min Therapeutic Activity:  [x]  See flow sheet : FOTO , PN, HEP demo and development, education on sleeping posture   Rationale: increase ROM and increase strength  to improve the patients ability to drive car with improved cervical ROM to check blind spots       min Neuromuscular Re-education:  [x]  See flow sheet :  Prone M, T   wall \"v\" with #2  Wall clocks with green t-band         Rationale: increase ROM and increase strength  to improve the patients ability to perform all daily 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 Patient is a candidate for cupping and without contraindications at this time. Patient was instructed in the indications/contraindications of cupping technique. The patient was educated in its purpose and risks/benefits. Patient was advised that redness is normal after technique is performed and that redness will disappear typically within one week. Dynamic cupping technique performed      Objective/Functional Measures with Therapist Assessment Noted with Response to Therapy Session:    Subjective: Patient was last seen on 22. Patient reports 70% improvements in sx since [facility]. Pain levels range from 0 to 4-5. Av-2  Patient's current complaints: mm tension in UT, tightness in LS, UT and SCM mm,quick turns with neck increase pain. Patient performing HEP consistently, HEP updated and given progression for later. Objective:  AROM : L/R  c/s rotation: 60/60  deg, L/R SB: 32/30deg, flexion: 1 finger to manibrium, extension: 50deg  Pain with end range Gorge rot R>L  TTP: Gorge TrP in UT mm   Functional Improvements: improved turning for driving to look behind,   Deficits: quick sudden movements, continued tightness, artist (looking down over work). , mm tension, deep neck flexor weakness,   FOTO: 65/100    Progress towards goals / Updated goals:5/23/22  Updated Goals to be accomplished in remaining 24 to 36 treatments:  1. Patient will improve bilateral cervical rotation AROM to at least 70 deg without increased pain in order to improve ease of turning head while driving. Status at last note/certification: Right 62 deg, Left 56 deg   Current: 60deg Gorge rotation , progressing with no pain with slow mvmt, increased with quick motion 7/14/22  AROM : L/R  c/s rotation: 60/60  deg, L/R SB: 32/30deg, flexion: 1 finger to manibrium, extension: 50deg  Pain with end range Gorge rot R>L 7/14/22  2. Patient will improve bilateral scapular strength to 5/5 in order to improve stability with recreational activities and work tasks. Status at last note/certification: B ER: 4+, Right MT 5/5 LT 4-/5  7/14/22  3. Patient will report worst neck/Right shoulder pain as 3/10 or less in order to improve quality of sleep.   Status at last note/certification: MET 3/18 6/3/2022, avg 1-2 pain levels, increase with quick motions to 4-5 (brief) 7/14/22      ASSESSMENT/Changes in Function:  See PN    Patient will continue to benefit from skilled PT services to modify and progress therapeutic interventions, address functional mobility deficits, address ROM deficits, address strength deficits, analyze and address soft tissue restrictions, analyze and cue movement patterns, analyze and modify body mechanics/ergonomics and assess and modify postural abnormalities to attain remaining goals.     []  See Plan of Care  [x]  See progress note/recertification  []  See Discharge Summary           PLAN  [x]  Upgrade activities as tolerated     [x]  Continue plan of care  []  Update interventions per flow sheet       []  Discharge due to:_  []  Other:_      Marek Mirza, PT 7/14/2022  10:54 AM    Future Appointments   Date Time Provider Aracelis Fulton   7/14/2022  9:15 AM Tressa Dinero, PT [facility]

## 2022-07-14 NOTE — PROGRESS NOTES
[address] [facility]ERAP[facility] AT [facility]  [address] [place] [address],[address], Kaya madera, Sarbjit Rodrigues  Phone [phone]  Fax  SUMMARY  Patient Name: Ten Mckeon : 1965   Treatment/Medical Diagnosis: Neck pain [M54.2]  Pain in right shoulder [M25.511]   Referral Source: Teodora Godoy MD     Date of Initial Visit: 22 Attended Visits: 12 Missed Visits: 1     SUMMARY OF TREATMENT  Treatment consist of therapeutic exercise including ROM, stretching, gentle strengthening, stabilization training, postural ed, patient education, HEP instruction, MHP, manual therapy. CURRENT STATUS  Subjective: Patient was last seen on 22. Patient reports 70% improvements in sx since [facility]. Pain levels range from 0 to 4-5. Av-2  Patient's current complaints: mm tension in UT, tightness in LS, UT and SCM mm,quick turns with neck increase pain. Patient performing HEP consistently, HEP updated and given progression for later. Objective:  AROM : L/R  c/s rotation: 60/60  deg, L/R SB: 32/30deg, flexion: 1 finger to manibrium, extension: 50deg  Pain with end range Gorge rot R>L  TTP: Gorge TrP in UT mm   Functional Improvements: improved turning for driving to look behind,   Deficits: quick sudden movements, continued tightness, artist (looking down over work). , mm tension, deep neck flexor weakness,   FOTO: 65/100     Progress towards goals / Updated goals:22  Updated Goals to be accomplished in remaining 24 to 36 treatments:  1. Patient will improve bilateral cervical rotation AROM to at least 70 deg without increased pain in order to improve ease of turning head while driving.   Status at last note/certification: Right 62 deg, Left 56 deg   Current: 60deg Gorge rotation , progressing with no pain with slow mvmt, increased with quick motion 22  AROM : L/R  c/s rotation: 60/60  deg, L/R SB: 32/30deg, flexion: 1 finger to manibrium, extension: 50deg  Pain with end range Gorge rot R>L 7/14/22  2. Patient will improve bilateral scapular strength to 5/5 in order to improve stability with recreational activities and work tasks. current: B ER: 4+, Right MT 5/5 LT 4-/5  7/14/22  3. Patient will report worst neck/Right shoulder pain as 3/10 or less in order to improve quality of sleep. Status at last note/certification: MET 8/89 6/3/2022,  Current:  avg 1-2 pain levels, increase with quick motions to 4-5 (brief) 7/14/22      RECOMMENDATIONS  Discontinue therapy. Progressing towards or have reached established goals. If you have any questions/comments please contact us directly at [phone]. Thank you for allowing us to assist in the care of your patient.     Therapist Signature: Janith Goodell, LUISA Date: 7/14/22   Reporting period 4/20/22 to 7/14/22 Time: 8:56 AM

## 2023-08-15 ENCOUNTER — HOSPITAL ENCOUNTER (OUTPATIENT)
Facility: HOSPITAL | Age: 58
Setting detail: RECURRING SERIES
Discharge: HOME | End: 2023-08-18
Payer: MEDICARE

## 2023-08-15 PROCEDURE — 97162 PT EVAL MOD COMPLEX 30 MIN: CPT | Performed by: PHYSICAL THERAPIST

## 2023-08-15 PROCEDURE — 97140 MANUAL THERAPY 1/> REGIONS: CPT | Performed by: PHYSICAL THERAPIST

## 2023-08-15 NOTE — PROGRESS NOTES
[address] [place] [address] PHYSICAL THERAPY  [address] [facility] [address] [place], [address] Phone: [phone] Fax [phone]  Plan of Care / Statement of Necessity for Physical Therapy Services     Patient Name: Pilar  : 1965   Medical   Diagnosis: *Left knee pain [M25.562] Treatment Diagnosis: M25.562  LEFT KNEE PAIN      Onset Date: End of May/Beginning  Payor: Payor: Iris Abts / Plan: MEDICARE PART A AND B / Product Type: *No Product type* /    Referral Source: Makenzie Benedict MD Start of Care [facility]): 8/15/2023   Prior Hospitalization: See medical history Provider #: [id number]   Prior Level of Function: IND, Artist, 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 She reports that she was playing corn-hole and her and her teammate won, her teammate picked her up and fell on her and the patients knee popped. She reports that the day of the injury she could walk on her toes but bearing full weight was excruciating. She went to the MD the next day and X-ray and was told possible patellar fracture. She had FU appointments and was diagnosed with a subluxation. She was given a brace but didn't wear it because she did not feel it did anything. Currently she rates her L knee pain a 3-4/10 at rest  and its worse an 8-10/10. Functional limitations include full ROM of the L knee, kneeling back on her knees leading to a bend > 90 deg, mini-squats, and fully straightening her L knee fully. Her symptoms are eased with rest and light stretching. She reports a history of L knee pain when she was 18years old. Negative for red flags. FOTO: 53/100.  Upon evaluation, pt ambulates with reduced heel toe pattern with reduce

## 2023-08-15 NOTE — PROGRESS NOTES
PHYSICAL / OCCUPATIONAL THERAPY - DAILY TREATMENT NOTE (updated )  For Eval visit    Patient Name: Donna Gaston    Date: 8/15/2023    : 1965  Insurance: Payor: MEDICARE / Plan: MEDICARE PART A AND B / Product Type: *No Product type* /      Patient  verified yes     Visit #   Current / Total 1 24   Time   In / Out 1024 1100   Total Treatment  Time  36   Pain   In / Out 3-4 3-4   Subjective Functional Status/Changes: See below     TREATMENT AREA =  Left knee pain [M25.562]    SUBJECTIVE:  Pt is a 66year old female sp L knee pain secondary to a possible patellar fracture/dislocation that happened at the end of May/early . She reports that she was playing corn-hole and her and her teammate won, her teammate picked her up and fell on her and the patients knee popped. She reports that the day of the injury she could walk on her toes but bearing full weight was excruciating. She went to the MD the next day and X-ray and was told possible patellar fracture. She had FU appointments and was diagnosed with a subluxation. She was given a brace but didn't wear it because she did not feel it did anything. Currently she rates her L knee pain a 3-4/10 at rest  and its worse an 8-10/10. Functional limitations include full ROM of the L knee, kneeling back on her knees leading to a bend > 90 deg, mini-squats, and fully straightening her L knee fully. Her symptoms are eased with rest and light stretching. She reports a history of L knee pain when she was 18years old. Negative for red flags. FOTO: 53/100. Co-morbidities: anxiety/panic disorders, TBI, depresison, headaches, Rotator cuff surgery      OBJECTIVE    Modalities Rationale:     decrease edema, decrease inflammation, and decrease pain to improve patient's ability to progress to PLOF and address remaining functional goals.      min [] Estim Unattended, type/location:                                      []  w/ice    []  w/heat    min [] Estim

## 2023-08-22 ENCOUNTER — HOSPITAL ENCOUNTER (OUTPATIENT)
Facility: HOSPITAL | Age: 58
Setting detail: RECURRING SERIES
Discharge: HOME | End: 2023-08-25
Payer: MEDICARE

## 2023-08-22 PROCEDURE — 97110 THERAPEUTIC EXERCISES: CPT | Performed by: PHYSICAL THERAPIST

## 2023-08-22 PROCEDURE — 97530 THERAPEUTIC ACTIVITIES: CPT | Performed by: PHYSICAL THERAPIST

## 2023-08-22 PROCEDURE — 97112 NEUROMUSCULAR REEDUCATION: CPT | Performed by: PHYSICAL THERAPIST

## 2023-08-22 PROCEDURE — G0283 ELEC STIM OTHER THAN WOUND: HCPCS | Performed by: PHYSICAL THERAPIST

## 2023-08-22 NOTE — PROGRESS NOTES
Therapeutic Procedures: Tx Min Billable or 1:1 Min (if diff from Tx Min) Procedure, Rationale, Specifics   29 29 11087 Therapeutic Exercise (timed):  increase ROM, strength, coordination, balance, and proprioception to improve patient's ability to progress to PLOF and address remaining functional goals. (see flow sheet as applicable)     Details if applicable:    Incline/HS stretch  Quad sets  SLRx3 without weight   8 8 51168 Neuromuscular Re-Education (timed):  improve balance, coordination, kinesthetic sense, posture, core stability and proprioception to improve patient's ability to develop conscious control of individual muscles and awareness of position of extremities in order to progress to PLOF and address remaining functional goals. (see flow sheet as applicable)     Details if applicable:    Quad sets  Standing [facility]   8 8 02133 Therapeutic Activity (timed):  use of dynamic activities replicating functional movements to increase ROM, strength, coordination, balance, and proprioception in order to improve patient's ability to progress to PLOF and address remaining functional goals. (see flow sheet as applicable)     Details if applicable:    Upright bike  Wall sits       05057 Manual Therapy (timed):  decrease pain, increase ROM, increase tissue extensibility, and increase postural awareness to improve patient's ability to progress to PLOF and address remaining functional goals. The manual therapy interventions were performed at a separate and distinct time from the therapeutic activities interventions . (see flow sheet as applicable)     Details if applicable:       73807 Self Care/Home Management (timed):  improve patient knowledge and understanding of pain reducing techniques, positioning, posture/ergonomics, and HEP  to improve patient's ability to progress to PLOF and address remaining functional goals.   (see flow sheet as applicable)     Details if applicable:     45 [address] [facility] Totals

## 2023-08-29 ENCOUNTER — HOSPITAL ENCOUNTER (OUTPATIENT)
Facility: HOSPITAL | Age: 58
Setting detail: RECURRING SERIES
Discharge: HOME | End: 2023-09-01
Payer: MEDICARE

## 2023-08-29 PROCEDURE — 97140 MANUAL THERAPY 1/> REGIONS: CPT | Performed by: PHYSICAL THERAPIST

## 2023-08-29 PROCEDURE — 97110 THERAPEUTIC EXERCISES: CPT | Performed by: PHYSICAL THERAPIST

## 2023-08-29 NOTE — PROGRESS NOTES
PHYSICAL / OCCUPATIONAL THERAPY - DAILY TREATMENT NOTE (updated )    Patient Name: Diaz Medina    Date: 2023    : 1965  Insurance: Payor: MEDICARE / Plan: MEDICARE PART A AND B / Product Type: *No Product type* /      Patient  verified yes     Visit #   Current / Total 3 24   Time   In / Out 1020 1118   Total Treatment Time 58   Pain   In / Out 5-6 2-3   Subjective Functional Status/Changes: Pt reports that her dog bumped her knee and she went through the roof. She notes that her knee pops all the time. TREATMENT AREA =  Left knee pain [M25.562]    OBJECTIVE    Modalities Rationale:     decrease edema, decrease inflammation, and decrease pain to improve patient's ability to progress to PLOF and address remaining functional goals. H min [x] Estim Unattended, type/location:  IFC to the L knee with ice in semi-reclined                                     [x]  w/ice    []  w/heat    min [] Estim Attended, type/location:                                     []  w/US     []  w/ice    []  w/heat    []  TENS insruct      min []  Mechanical Traction: type/lbs                   []  pro   []  sup   []  int   []  cont    []  before manual    []  after manual    min []  Ultrasound, settings/location:      min []  Iontophoresis w/ dexamethasone, location:                                               []  take home patch       []  in clinic   10 min  unbilled [x]  Ice     []  Heat    location/position: Semi-reclined    min []  Paraffin,  details:     min []  Vasopneumatic Device, press/temp:     min []  Evaristo Tobar / Christi Been: If using vaso (only need to measure limb vaso being performed on)      pre-treatment girth :       post-treatment girth :       measured at (landmark location) :      min []  Other:    Skin assessment post-treatment (if applicable):    []  intact    []  redness- no adverse reaction                 []redness - adverse reaction:          Therapeutic Procedures:   Tx Min Billable

## 2023-08-31 ENCOUNTER — APPOINTMENT (OUTPATIENT)
Facility: HOSPITAL | Age: 58
End: 2023-08-31
Payer: MEDICARE

## 2023-09-05 ENCOUNTER — HOSPITAL ENCOUNTER (OUTPATIENT)
Facility: HOSPITAL | Age: 58
Setting detail: RECURRING SERIES
Discharge: HOME | End: 2023-09-08
Payer: MEDICARE

## 2023-09-05 PROCEDURE — 97530 THERAPEUTIC ACTIVITIES: CPT | Performed by: PHYSICAL THERAPIST

## 2023-09-05 PROCEDURE — 97140 MANUAL THERAPY 1/> REGIONS: CPT | Performed by: PHYSICAL THERAPIST

## 2023-09-05 PROCEDURE — 97110 THERAPEUTIC EXERCISES: CPT | Performed by: PHYSICAL THERAPIST

## 2023-09-05 NOTE — PROGRESS NOTES
PHYSICAL / OCCUPATIONAL THERAPY - DAILY TREATMENT NOTE (updated )    Patient Name: Yeimi Bachelor    Date: 2023    : 1965  Insurance: Payor: MEDICARE / Plan: MEDICARE PART A AND B / Product Type: *No Product type* /      Patient  verified yes     Visit #   Current / Total 4 24   Time   In / Out 1100 1145   Total Treatment Time 45   Pain   In / Out 10 0   Subjective Functional Status/Changes: Pt reports that she took the weekend off and her knee feels better. She reports that when she feels her knee wanting to pop she stops so it doesn't. TREATMENT AREA =  Left knee pain [M25.562]    OBJECTIVE    Modalities Rationale:     decrease edema, decrease inflammation, and decrease pain to improve patient's ability to progress to PLOF and address remaining functional goals. H min [x] Estim Unattended, type/location:  IFC to the L knee with ice in semi-reclined                                     [x]  w/ice    []  w/heat    min [] Estim Attended, type/location:                                     []  w/US     []  w/ice    []  w/heat    []  TENS insruct      min []  Mechanical Traction: type/lbs                   []  pro   []  sup   []  int   []  cont    []  before manual    []  after manual    min []  Ultrasound, settings/location:      min []  Iontophoresis w/ dexamethasone, location:                                               []  take home patch       []  in clinic   10 min  unbilled [x]  Ice     []  Heat    location/position: Semi-reclined    min []  Paraffin,  details:     min []  Vasopneumatic Device, press/temp:     min []  Sarahy Wausa / Bryant Hammed:     If using vaso (only need to measure limb vaso being performed on)      pre-treatment girth :       post-treatment girth :       measured at (landmark location) :      min []  Other:    Skin assessment post-treatment (if applicable):    []  intact    []  redness- no adverse reaction                 []redness - adverse reaction:

## 2023-09-07 ENCOUNTER — HOSPITAL ENCOUNTER (OUTPATIENT)
Facility: HOSPITAL | Age: 58
Setting detail: RECURRING SERIES
Discharge: HOME | End: 2023-09-10
Payer: MEDICARE

## 2023-09-07 PROCEDURE — 97110 THERAPEUTIC EXERCISES: CPT | Performed by: PHYSICAL THERAPIST

## 2023-09-07 PROCEDURE — 97530 THERAPEUTIC ACTIVITIES: CPT | Performed by: PHYSICAL THERAPIST

## 2023-09-07 PROCEDURE — G0283 ELEC STIM OTHER THAN WOUND: HCPCS | Performed by: PHYSICAL THERAPIST

## 2023-09-07 NOTE — PROGRESS NOTES
PHYSICAL / OCCUPATIONAL THERAPY - DAILY TREATMENT NOTE (updated )    Patient Name: Jaya Fitzpatrick    Date: 2023    : 1965  Insurance: Payor: MEDICARE / Plan: MEDICARE PART A AND B / Product Type: *No Product type* /      Patient  verified yes     Visit #   Current / Total 5 24   Time   In / Out 903 958   Total Treatment Time 55   Pain   In / Out 3/10 010   Subjective Functional Status/Changes: She reports that she saw her MD yesterday and they want to do an MRI. She reports that her knee is sore from playing corn hole last night. TREATMENT AREA =  Left knee pain [M25.562]    OBJECTIVE    Modalities Rationale:     decrease edema, decrease inflammation, and decrease pain to improve patient's ability to progress to PLOF and address remaining functional goals. 15 min [x] Estim Unattended, type/location:  IFC to the L knee with ice in semi-reclined                                     [x]  w/ice    []  w/heat    min [] Estim Attended, type/location:                                     []  w/US     []  w/ice    []  w/heat    []  TENS insruct      min []  Mechanical Traction: type/lbs                   []  pro   []  sup   []  int   []  cont    []  before manual    []  after manual    min []  Ultrasound, settings/location:      min []  Iontophoresis w/ dexamethasone, location:                                               []  take home patch       []  in clinic    min  unbilled [x]  Ice     []  Heat    location/position: Semi-reclined    min []  Paraffin,  details:     min []  Vasopneumatic Device, press/temp:     min []  Chet Spencer / Shiv Acuña:     If using vaso (only need to measure limb vaso being performed on)      pre-treatment girth :       post-treatment girth :       measured at (landmark location) :      min []  Other:    Skin assessment post-treatment (if applicable):    []  intact    []  redness- no adverse reaction                 []redness - adverse reaction:          Therapeutic

## 2023-09-12 ENCOUNTER — HOSPITAL ENCOUNTER (OUTPATIENT)
Facility: HOSPITAL | Age: 58
Setting detail: RECURRING SERIES
Discharge: HOME | End: 2023-09-15
Payer: MEDICARE

## 2023-09-12 PROCEDURE — 97110 THERAPEUTIC EXERCISES: CPT | Performed by: PHYSICAL THERAPIST

## 2023-09-12 PROCEDURE — 97140 MANUAL THERAPY 1/> REGIONS: CPT | Performed by: PHYSICAL THERAPIST

## 2023-09-12 PROCEDURE — 97530 THERAPEUTIC ACTIVITIES: CPT | Performed by: PHYSICAL THERAPIST

## 2023-09-14 ENCOUNTER — HOSPITAL ENCOUNTER (OUTPATIENT)
Facility: HOSPITAL | Age: 58
Setting detail: RECURRING SERIES
Discharge: HOME | End: 2023-09-17
Payer: MEDICARE

## 2023-09-14 PROCEDURE — 97530 THERAPEUTIC ACTIVITIES: CPT | Performed by: PHYSICAL THERAPIST

## 2023-09-14 PROCEDURE — 97110 THERAPEUTIC EXERCISES: CPT | Performed by: PHYSICAL THERAPIST

## 2023-09-14 PROCEDURE — G0283 ELEC STIM OTHER THAN WOUND: HCPCS | Performed by: PHYSICAL THERAPIST

## 2023-09-14 NOTE — PROGRESS NOTES
PHYSICAL / OCCUPATIONAL THERAPY - DAILY TREATMENT NOTE (updated )    Patient Name: Abby Rod    Date: 2023    : 1965  Insurance: Payor: MEDICARE / Plan: MEDICARE PART A AND B / Product Type: *No Product type* /      Patient  verified yes     Visit #   Current / Total 7 24   Time   In / Out 1101 1205   Total Treatment Time 64   Pain   In / Out 2/10 2/10   Subjective Functional Status/Changes: Pt reports 10% improvement since SOC. She reports a reduction in pain but attributes that to her not doing any activity that bothers her. Functional limitations include squatting, lunges, extending her L knee back, climbing stairs, and prolonged walking. She reports that she uses the tape that helps support her knee. When the tape comes off the knee pain is worse. She reports that her MRI is Monday not Tuesday. TREATMENT AREA =  Left knee pain [M25.562]    OBJECTIVE    Modalities Rationale:     decrease edema, decrease inflammation, and decrease pain to improve patient's ability to progress to PLOF and address remaining functional goals. 15+5  min [x] Estim Unattended, type/location:  IFC to the L knee with ice in semi-reclined                                     [x]  w/ice    []  w/heat    min [] Estim Attended, type/location:                                     []  w/US     []  w/ice    []  w/heat    []  TENS insruct      min []  Mechanical Traction: type/lbs                   []  pro   []  sup   []  int   []  cont    []  before manual    []  after manual    min []  Ultrasound, settings/location:      min []  Iontophoresis w/ dexamethasone, location:                                               []  take home patch       []  in clinic    min  unbilled [x]  Ice     []  Heat    location/position: Semi-reclined    min []  Paraffin,  details:     min []  Vasopneumatic Device, press/temp:     min []  Giselle Gandara / Baljit Cano:     If using vaso (only need to measure limb vaso being performed on)
90-0 deg as an indicator of improved quad strength to maximize functional transfers. Status at IE 90-60 deg  Current: 90-0 deg this session 9/14/23  4. Pt will be able to stand and walk for 1 hour with no more than 2/10 pain levels to return to PLOF. Status at IE NT  Current: pt reports she can walk 10 min or so without pain 9/14/23       LONG-TERM GOALS TO BE ACHIEVED IN 17 TREATMENTS:    Pt will be able to perform full squat without pain to return to PLOF. Status at IE with 8/10 pain  2. Pt will be able to ambulate 1 hour without pain to be able to walk her dogs at night. Status at IE NT  3. 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 Status at IE 8-10/10  4. Pt will improve FOTO score to at least 70/100 as a functional indicator of improved mobility  Status at IE 53/100    Payor: MEDICARE / Plan: MEDICARE PART A AND B / Product Type: *No Product type* /       RECOMMENDATIONS  Continue therapy per initial Plan of Care or most recent Medicare Recert. If you have any questions/comments please contact us directly. Thank you for allowing us to assist in the care of your patient.     Edward Christiansen, PT       9/14/2023       1:35 PM

## 2023-09-19 ENCOUNTER — APPOINTMENT (OUTPATIENT)
Facility: HOSPITAL | Age: 58
End: 2023-09-19
Payer: MEDICARE

## 2023-09-21 ENCOUNTER — APPOINTMENT (OUTPATIENT)
Facility: HOSPITAL | Age: 58
End: 2023-09-21
Payer: MEDICARE

## 2023-09-27 ENCOUNTER — HOSPITAL ENCOUNTER (OUTPATIENT)
Facility: HOSPITAL | Age: 58
Setting detail: RECURRING SERIES
End: 2023-09-27
Payer: MEDICARE

## 2023-10-03 ENCOUNTER — HOSPITAL ENCOUNTER (OUTPATIENT)
Facility: HOSPITAL | Age: 58
Setting detail: RECURRING SERIES
Discharge: HOME | End: 2023-10-06
Payer: MEDICARE

## 2023-10-03 PROCEDURE — 97530 THERAPEUTIC ACTIVITIES: CPT | Performed by: PHYSICAL THERAPIST

## 2023-10-03 PROCEDURE — 97110 THERAPEUTIC EXERCISES: CPT | Performed by: PHYSICAL THERAPIST

## 2023-10-03 PROCEDURE — 97140 MANUAL THERAPY 1/> REGIONS: CPT | Performed by: PHYSICAL THERAPIST

## 2023-10-04 NOTE — PROGRESS NOTES
PHYSICAL / OCCUPATIONAL THERAPY - DAILY TREATMENT NOTE (updated )    Patient Name: Ryan Turk    Date:10/03/2023   : 1965  Insurance: Payor: MEDICARE / Plan: MEDICARE PART A AND B / Product Type: *No Product type* /      Patient  verified yes     Visit #   Current / Total 8 24   Time   In / Out 1100 1200   Total Treatment Time 60   Pain   In / Out 2/10 2/10   Subjective Functional Status/Changes: Pt reports having a FU with MD since last visit as well as a cortisone injection. She notes that her MRI showed an ACL tear. No plans for surgical interventions at this time. TREATMENT AREA =  Left knee pain [M25.562]    OBJECTIVE    Modalities Rationale:     decrease edema, decrease inflammation, and decrease pain to improve patient's ability to progress to PLOF and address remaining functional goals. PD  min [x] Estim Unattended, type/location:  IFC to the L knee with ice in semi-reclined                                     [x]  w/ice    []  w/heat    min [] Estim Attended, type/location:                                     []  w/US     []  w/ice    []  w/heat    []  TENS insruct      min []  Mechanical Traction: type/lbs                   []  pro   []  sup   []  int   []  cont    []  before manual    []  after manual    min []  Ultrasound, settings/location:      min []  Iontophoresis w/ dexamethasone, location:                                               []  take home patch       []  in clinic    min  unbilled [x]  Ice     []  Heat    location/position: Semi-reclined    min []  Paraffin,  details:     min []  Vasopneumatic Device, press/temp:     min []  Everardo Pouch / Lucille Gobble:     If using vaso (only need to measure limb vaso being performed on)      pre-treatment girth :       post-treatment girth :       measured at (landmark location) :      min []  Other:    Skin assessment post-treatment (if applicable):    []  intact    []  redness- no adverse reaction                 []redness -

## 2023-10-05 ENCOUNTER — HOSPITAL ENCOUNTER (OUTPATIENT)
Facility: HOSPITAL | Age: 58
Setting detail: RECURRING SERIES
Discharge: HOME | End: 2023-10-08
Payer: MEDICARE

## 2023-10-05 PROCEDURE — 97530 THERAPEUTIC ACTIVITIES: CPT | Performed by: PHYSICAL THERAPIST

## 2023-10-05 PROCEDURE — 97535 SELF CARE MNGMENT TRAINING: CPT | Performed by: PHYSICAL THERAPIST

## 2023-10-09 NOTE — PROGRESS NOTES
10/5/2023   1:40 PM     Future Appointments   Date Time Provider [address]   10/10/2023 11:40 AM Nemiah Snellen, PT [facility]   10/12/2023 11:00 AM Nemiah Snellen, PT [facility]   10/17/2023 11:00 AM Nemiah Snellen, PT [facility]   10/24/2023 11:00 AM Nemiah Snellen, PT [facility]   10/26/2023 12:20 PM Nemiah Snellen, PT [facility]   10/31/2023 11:00 AM Nemiah Snellen, PT [facility]

## 2023-10-10 ENCOUNTER — HOSPITAL ENCOUNTER (OUTPATIENT)
Facility: HOSPITAL | Age: 58
Setting detail: RECURRING SERIES
Discharge: HOME | End: 2023-10-13
Payer: MEDICARE

## 2023-10-10 PROCEDURE — 97110 THERAPEUTIC EXERCISES: CPT | Performed by: PHYSICAL THERAPIST

## 2023-10-10 PROCEDURE — 97530 THERAPEUTIC ACTIVITIES: CPT | Performed by: PHYSICAL THERAPIST

## 2023-10-10 PROCEDURE — 97140 MANUAL THERAPY 1/> REGIONS: CPT | Performed by: PHYSICAL THERAPIST

## 2023-10-24 ENCOUNTER — HOSPITAL ENCOUNTER (OUTPATIENT)
Facility: HOSPITAL | Age: 58
Setting detail: RECURRING SERIES
Discharge: HOME | End: 2023-10-27
Payer: MEDICARE

## 2023-10-24 PROCEDURE — 97530 THERAPEUTIC ACTIVITIES: CPT | Performed by: PHYSICAL THERAPIST

## 2023-10-24 PROCEDURE — 97140 MANUAL THERAPY 1/> REGIONS: CPT | Performed by: PHYSICAL THERAPIST

## 2023-10-24 NOTE — PROGRESS NOTES
[address] PHYSICAL THERAPY  [address] [facility] [place] [address]  Phone: [phone]   [phone]  PHYSICAL THERAPY PROGRESS NOTE  Patient Name: Italia Gutierres : 1965   Treatment/Medical Diagnosis: Left knee pain [M25.562]   Referral Source: Skye August MD     Date of Initial Visit: 8/15/23 Attended Visits: 11 Missed Visits: 3     SUMMARY OF TREATMENT  Pt seen for 11 PT sessions for L knee pain. Therapy has included therex, theract, neuro re-education, K-tape for patellar stability and modalities for pain management. CURRENT STATUS  Pt reports 10% improvement of symptoms since [facility]. She reports that since she got the injection in her knee she has been able to sleep better. Functional limitations: full extension of the L knee, walking carrying various objects, moderate exercises, and long distances. Pt reports that she is only able to walk 10-15 min to walk her dog before onset of pain. Upon reassessment, pt continues to have pain in the L knee with prolonged weight-bearing activities and with total knee extension on the L. She reports improvement in the L knee pain with recent injection. Pt given functional HEP to continue to progress overall strength and stability. She would benefit from continued therapy at least 1 time a week to update/progress as overall strength and stability to return to PLOF symptom free.      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
hour without pain to be able to walk her dogs at night. Status at IE NT  Current: 10-15 min before onset 10/24/23  3. 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 Status at IE 8-10/10  Current: 6/10 10/24/23  4.   Pt will improve FOTO score to at least 70/100 as a functional indicator of improved mobility  Status at IE 53/100  FOTO: 55/100 10/24/23    PLAN  [x]  Continue plan of care  [x]  Upgrade activities as tolerated  []  Discharge due to :  [x]  Other: Continue PT 1-2x/week for remaining 13 visits in current certification period    Connor Haque PT    10/24/2023   1206 PM     Future Appointments   Date Time Provider [address] [place] [address]   10/31/2023 11:00 AM Connor Haque PT [facility]

## 2023-10-26 ENCOUNTER — APPOINTMENT (OUTPATIENT)
Facility: HOSPITAL | Age: 58
End: 2023-10-26
Payer: MEDICARE

## 2023-10-31 ENCOUNTER — HOSPITAL ENCOUNTER (OUTPATIENT)
Facility: HOSPITAL | Age: 58
Setting detail: RECURRING SERIES
Discharge: HOME | End: 2023-11-03
Payer: MEDICARE

## 2023-10-31 PROCEDURE — 97140 MANUAL THERAPY 1/> REGIONS: CPT | Performed by: PHYSICAL THERAPIST

## 2023-10-31 PROCEDURE — 97110 THERAPEUTIC EXERCISES: CPT | Performed by: PHYSICAL THERAPIST

## 2023-10-31 NOTE — PROGRESS NOTES
techniques, positioning, posture/ergonomics, and HEP  to improve patient's ability to progress to PLOF and address remaining functional goals. (see flow sheet as applicable)     Details if applicable:  Developed HEP in [place] ally   [phone] [facility] Totals Reminder: bill using total billable min of TIMED therapeutic procedures (example: do not include dry needle or estim unattended, both untimed codes, in totals to left)  8-22 min = 1 unit; 23-37 min = 2 units; 38-52 min = 3 units; 53-67 min = 4 units; 68-82 min = 5 units   Total Total     [x]  Patient Education billed concurrently with other procedures   [x] Review HEP    [] Progressed/Changed HEP, detail:    [] Other detail:       Objective Information/Functional Measures/Assessment:  Reduced weight of exercises due to increased quad lag/inability to perform TKE with 2# weight- she was able to achieve proper form with 1#  Pt brought in her own K-tape and was educated on self- application. She verbalized understanding  Pt IND with current program and self strengthening- awaiting on second opinion to determine continuation of PT after this session        Patient will continue to benefit from skilled PT / OT services to modify and progress therapeutic interventions, analyze and address functional mobility deficits, analyze and address ROM deficits, analyze and address strength deficits, analyze and address soft tissue restrictions, analyze and modify for postural abnormalities, and analyze and address imbalance/dizziness to address functional deficits and attain remaining goals. Progress toward goals / Updated goals:  []  See Progress Note/Recertification  1. Pt will be able to ambulate 1 hour without pain to be able to walk her dogs at night. Status at  NT  Current: has not tried 10/31/23     2. 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 Status at  8-10/10  Current:      3.   Pt will improve FOTO score to at least 70/100 as a

## 2024-01-08 ENCOUNTER — HOSPITAL ENCOUNTER (OUTPATIENT)
Facility: HOSPITAL | Age: 59
Setting detail: RECURRING SERIES
Discharge: HOME | End: 2024-01-11
Payer: MEDICARE

## 2024-01-08 PROCEDURE — 97535 SELF CARE MNGMENT TRAINING: CPT | Performed by: PHYSICAL THERAPIST

## 2024-01-08 PROCEDURE — 97140 MANUAL THERAPY 1/> REGIONS: CPT | Performed by: PHYSICAL THERAPIST

## 2024-01-08 PROCEDURE — 97162 PT EVAL MOD COMPLEX 30 MIN: CPT | Performed by: PHYSICAL THERAPIST

## 2024-01-08 PROCEDURE — 97116 GAIT TRAINING THERAPY: CPT | Performed by: PHYSICAL THERAPIST

## 2024-01-08 NOTE — PROGRESS NOTES
PHYSICAL / OCCUPATIONAL THERAPY - DAILY TREATMENT NOTE (updated )  For Eval visit    Patient Name: Shila Montero    Date: 2024    : 1965  Insurance: Payor: MEDICARE / Plan: MEDICARE PART A AND B / Product Type: *No Product type* /      Patient  verified yes     Visit #   Current / Total 1 36   Time   In / Out 1201 1250   Total Treatment  Time  49   Pain   In / Out 2-3 2-3   Subjective Functional Status/Changes: See below     NEXT PROGRESS NOTE DUE: 24    TREATMENT AREA =  Left knee pain [M25.562]    SUBJECTIVE:  Pt is a 58 year old female sp L ACL repair cadaver graft on 24. Currently she rates her pain 2-3/10 and a 6-7/10 at its worst. Functional limitations are consistent with recent surgical interventions. Pt goals for PT are to be pain free, walk, and go hiking without restrictions.     Co-morbidities: anxiety/panic disorders, TBI, depresison, headaches, Rotator cuff surgery         OBJECTIVE    Modalities Rationale:     decrease edema, decrease inflammation, and decrease pain to improve patient's ability to progress to PLOF and address remaining functional goals.     min [] Estim Unattended, type/location:                                      []  w/ice    []  w/heat    min [] Estim Attended, type/location:                                     []  w/US     []  w/ice    []  w/heat    []  TENS insruct      min []  Mechanical Traction: type/lbs                   []  pro   []  sup   []  int   []  cont    []  before manual    []  after manual    min []  Ultrasound, settings/location:      min []  Iontophoresis w/ dexamethasone, location:                                               []  take home patch       []  in clinic    min  unbilled []  Ice     []  Heat    location/position:     min []  Paraffin,  details:     min []  Vasopneumatic Device, press/temp:     min []  Whirlpool / Fluido:    If using vaso (only need to measure limb vaso being performed on)      pre-treatment girth :      
initiated   2.  Pt will improve L knee AROM 0-90 deg to improve functional mobility   Status at IE -5 deg to 46 deg   3.  Pt will be able to ambulate with full weight-bearing through the L LE demonstrating improved gait  using B axillary crutches.  Status at IE Partial to non weight-bearing on the L with B axillary crutches.  4.  PT will be able to perform a SLR with brace on the L without quad lag to progress to ambulation without brace and crutches.  Status at IE NT     Long Term Goals: To be accomplished in 36 treatments:   Pt will improve L knee AROM 0-120 deg to improve functional mobility   Status at IE -5 deg to 46 deg   2.   Pt will be able to ambulate with full weight-bearing through the L LE demonstrating improved gait  community distances without AD demonstrating normalized gait pattern.  Status at IE Partial to non weight-bearing on the L with B axillary crutches.  3.  Pt will improve L knee strength to 5/5 to improve stairs and functional squat without pain.   Status at IE NT per MD protocol  4.  Pt will improve FOTO score to at least 57/100 as a functional indicator of improved mobility.  Status at IE 26/100    Frequency / Duration: Patient would benefit from skilled PT 3 times per week for up to 36 sessions as needed in this certification period.  Goals will be assigned and reassessed every 10 visits/ 30 days per guidelines .  Patient/ Caregiver education and instruction: Diagnosis, prognosis, self care, brace/ splint application, and exercises [x]  Plan of care has been reviewed with PTA    Certification Period: 1/9/24 to 4/7/24    Laura Humphreys, PT       1/8/2024       2:14 PM  ===================================================================  I certify that the above Therapy Services are being furnished while the patient is under my care. I agree with the treatment plan and certify that this therapy is necessary.    Physician's Signature:_________________________

## 2024-01-10 ENCOUNTER — HOSPITAL ENCOUNTER (OUTPATIENT)
Facility: HOSPITAL | Age: 59
Setting detail: RECURRING SERIES
Discharge: HOME | End: 2024-01-13
Payer: MEDICARE

## 2024-01-10 PROCEDURE — 97110 THERAPEUTIC EXERCISES: CPT | Performed by: PHYSICAL THERAPIST

## 2024-01-10 PROCEDURE — 97140 MANUAL THERAPY 1/> REGIONS: CPT | Performed by: PHYSICAL THERAPIST

## 2024-01-10 PROCEDURE — 97530 THERAPEUTIC ACTIVITIES: CPT | Performed by: PHYSICAL THERAPIST

## 2024-01-10 PROCEDURE — 97112 NEUROMUSCULAR REEDUCATION: CPT | Performed by: PHYSICAL THERAPIST

## 2024-01-10 NOTE — PROGRESS NOTES
1/15/2024  2:00 PM Nora Villalpando, PT [facility]   1/17/2024 10:20 AM Laura Humphreys, PT [facility]   1/19/2024  2:00 PM Eloisa Villagomez, PT [facility]   1/22/2024 11:00 AM Laura Humphreys, PT [facility]   1/24/2024 11:00 AM Laura Humphreys, PT [facility]   1/26/2024 10:20 AM Nora Villalpando, PT [facility]   1/29/2024 11:00 AM Laura Humphreys, PT [facility]   1/31/2024 11:00 AM Laura Humphreys, PT [facility]   2/2/2024 11:40 AM Eloisa Villagomez, PT [facility]

## 2024-01-12 ENCOUNTER — HOSPITAL ENCOUNTER (OUTPATIENT)
Facility: HOSPITAL | Age: 59
Setting detail: RECURRING SERIES
Discharge: HOME | End: 2024-01-15
Payer: MEDICARE

## 2024-01-12 PROCEDURE — 97112 NEUROMUSCULAR REEDUCATION: CPT | Performed by: GENERAL ACUTE CARE HOSPITAL

## 2024-01-12 PROCEDURE — 97530 THERAPEUTIC ACTIVITIES: CPT | Performed by: GENERAL ACUTE CARE HOSPITAL

## 2024-01-12 PROCEDURE — 97116 GAIT TRAINING THERAPY: CPT | Performed by: GENERAL ACUTE CARE HOSPITAL

## 2024-01-12 NOTE — PROGRESS NOTES
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 Took pain meds today but pain still 7/10. She reports did not perform HEP yesterday due to pain.      NEXT PROGRESS NOTE DUE: 24    TREATMENT AREA =  Left knee pain [M25.562]      OBJECTIVE    Modalities Rationale:     decrease edema, decrease inflammation, and decrease pain to improve patient's ability to progress to PLOF and address remaining functional goals.     min [] Estim Unattended, type/location:                                      []  w/ice    []  w/heat    min [] Estim Attended, type/location:                                     []  w/US     []  w/ice    []  w/heat    []  TENS insruct      min []  Mechanical Traction: type/lbs                   []  pro   []  sup   []  int   []  cont    []  before manual    []  after manual    min []  Ultrasound, settings/location:      min []  Iontophoresis w/ dexamethasone, location:                                               []  take home patch       []  in clinic    min  unbilled []  Ice     []  Heat    location/position:     min []  Paraffin,  details:     min []  Vasopneumatic Device, press/temp:     min []  Whirlpool / Fluido:    If using vaso (only need to measure limb vaso being performed on)      pre-treatment girth :       post-treatment girth :       measured at (landmark location) :      min []  Other:    Skin assessment post-treatment (if applicable):    []  intact    []  redness- no adverse reaction                 []redness - adverse

## 2024-01-15 ENCOUNTER — HOSPITAL ENCOUNTER (OUTPATIENT)
Facility: HOSPITAL | Age: 59
Setting detail: RECURRING SERIES
Discharge: HOME | End: 2024-01-18
Payer: MEDICARE

## 2024-01-15 PROCEDURE — 97110 THERAPEUTIC EXERCISES: CPT | Performed by: PHYSICAL THERAPIST

## 2024-01-15 PROCEDURE — 97116 GAIT TRAINING THERAPY: CPT | Performed by: PHYSICAL THERAPIST

## 2024-01-15 PROCEDURE — 97112 NEUROMUSCULAR REEDUCATION: CPT | Performed by: PHYSICAL THERAPIST

## 2024-01-15 PROCEDURE — 97140 MANUAL THERAPY 1/> REGIONS: CPT | Performed by: PHYSICAL THERAPIST

## 2024-01-15 PROCEDURE — G0283 ELEC STIM OTHER THAN WOUND: HCPCS | Performed by: PHYSICAL THERAPIST

## 2024-01-15 NOTE — PROGRESS NOTES
units; 53-67 min = 4 units; 68-82 min = 5 units   Total Total     [x]  Patient Education billed concurrently with other procedures   [x] Review HEP    [] Progressed/Changed HEP, detail:    [] Other detail:       Objective Information/Functional Measures/Assessment:  Progressed to standing hip ABD and Toe raises.   Left knee  flexion improved to visual ~60deg with PROM   B/L axillary crutches adjusted hand  higher for improved elbow leverage  Pt to return for FU tomorrow. Progress as tolerated nv     Patient will continue to benefit from skilled PT / OT services to modify and progress therapeutic interventions, analyze and address functional mobility deficits, analyze and address ROM deficits, analyze and address strength deficits, analyze and address soft tissue restrictions, and analyze and address imbalance/dizziness to address functional deficits and attain remaining goals.    Progress toward goals / Updated goals:  [x]  See POC   Pt will be IND and compliant with HEP for self-management of symptoms.   Status at IE goal initiated   Current: did not perform yesterday due to pain 01/12/2024  2.  Pt will improve L knee AROM 0-90 deg to improve functional mobility   Status at IE -5 deg to 46 deg   Current: 0 to 55 deg AAROM 01/12/2024, ~60 deg PROM  1/15/24  3.  Pt will be able to ambulate with full weight-bearing through the L LE demonstrating improved gait  using B axillary crutches.  Status at IE Partial to non weight-bearing on the L with B axillary crutches.  4.  PT will be able to perform a SLR with brace on the L without quad lag to progress to ambulation without brace and crutches.  Status at IE NT     PLAN  yes Continue plan of care  [x]  Upgrade activities as tolerated  []  Discharge due to :  [x]  Other: check goals NV    Nora Villalpando PT    1/15/2024    1:13 PM    Future Appointments   Date Time Provider Department Center   1/15/2024  2:00 PM Nora Villalpando PT [facility]   1/17/2024 10:20 AM

## 2024-01-17 ENCOUNTER — HOSPITAL ENCOUNTER (OUTPATIENT)
Facility: HOSPITAL | Age: 59
Setting detail: RECURRING SERIES
Discharge: HOME | End: 2024-01-20
Payer: MEDICARE

## 2024-01-17 PROCEDURE — 97116 GAIT TRAINING THERAPY: CPT | Performed by: PHYSICAL THERAPIST

## 2024-01-17 PROCEDURE — G0283 ELEC STIM OTHER THAN WOUND: HCPCS | Performed by: PHYSICAL THERAPIST

## 2024-01-17 PROCEDURE — 97110 THERAPEUTIC EXERCISES: CPT | Performed by: PHYSICAL THERAPIST

## 2024-01-17 NOTE — PROGRESS NOTES
PHYSICAL / OCCUPATIONAL THERAPY - DAILY TREATMENT NOTE (updated )  For Eval visit    Patient Name: Shila Montero    Date: 2024    : 1965  Insurance: Payor: MEDICARE / Plan: MEDICARE PART A AND B / Product Type: *No Product type* /      Patient  verified yes     Visit #   Current / Total 5 36   Time   In / Out 1022 1130   Total Treatment  Time  68   Pain   In / Out 6 5   Subjective Functional Status/Changes: She reports that she saw the MD yesterday. She is doing well. He told her to take Aspirin for the swelling.      NEXT PROGRESS NOTE DUE: 24    TREATMENT AREA =  Left knee pain [M25.562]      OBJECTIVE    Modalities Rationale:     decrease edema, decrease inflammation, and decrease pain to improve patient's ability to progress to PLOF and address remaining functional goals.    10+ 5 min set up min [x] Estim Unattended, type/location: [place] Estim 10/10 to the L quad with quad set                                     []  w/ice    []  w/heat    min [] Estim Attended, type/location:                                     []  w/US     []  w/ice    []  w/heat    []  TENS insruct      min []  Mechanical Traction: type/lbs                   []  pro   []  sup   []  int   []  cont    []  before manual    []  after manual    min []  Ultrasound, settings/location:      min []  Iontophoresis w/ dexamethasone, location:                                               []  take home patch       []  in clinic    min  unbilled []  Ice     []  Heat    location/position:     min []  Paraffin,  details:     min []  Vasopneumatic Device, press/temp:     min []  Whirlpool / Fluido:    If using vaso (only need to measure limb vaso being performed on)      pre-treatment girth :       post-treatment girth :       measured at (landmark location) :      min []  Other:    Skin assessment post-treatment (if applicable):    []  intact    []  redness- no adverse reaction                 []redness - adverse reaction:

## 2024-01-19 ENCOUNTER — HOSPITAL ENCOUNTER (OUTPATIENT)
Facility: HOSPITAL | Age: 59
Setting detail: RECURRING SERIES
Discharge: HOME | End: 2024-01-22
Payer: MEDICARE

## 2024-01-19 PROCEDURE — 97116 GAIT TRAINING THERAPY: CPT | Performed by: GENERAL ACUTE CARE HOSPITAL

## 2024-01-19 PROCEDURE — 97110 THERAPEUTIC EXERCISES: CPT | Performed by: GENERAL ACUTE CARE HOSPITAL

## 2024-01-19 PROCEDURE — G0283 ELEC STIM OTHER THAN WOUND: HCPCS | Performed by: GENERAL ACUTE CARE HOSPITAL

## 2024-01-22 ENCOUNTER — HOSPITAL ENCOUNTER (OUTPATIENT)
Facility: HOSPITAL | Age: 59
Setting detail: RECURRING SERIES
Discharge: HOME | End: 2024-01-25
Payer: MEDICARE

## 2024-01-22 PROCEDURE — 97116 GAIT TRAINING THERAPY: CPT | Performed by: PHYSICAL THERAPIST

## 2024-01-22 PROCEDURE — 97110 THERAPEUTIC EXERCISES: CPT | Performed by: PHYSICAL THERAPIST

## 2024-01-24 ENCOUNTER — HOSPITAL ENCOUNTER (OUTPATIENT)
Facility: HOSPITAL | Age: 59
Setting detail: RECURRING SERIES
Discharge: HOME | End: 2024-01-27
Payer: MEDICARE

## 2024-01-24 PROCEDURE — 97110 THERAPEUTIC EXERCISES: CPT | Performed by: PHYSICAL THERAPIST

## 2024-01-24 PROCEDURE — 97530 THERAPEUTIC ACTIVITIES: CPT | Performed by: PHYSICAL THERAPIST

## 2024-01-24 PROCEDURE — 97140 MANUAL THERAPY 1/> REGIONS: CPT | Performed by: PHYSICAL THERAPIST

## 2024-01-24 NOTE — PROGRESS NOTES
PHYSICAL / OCCUPATIONAL THERAPY - DAILY TREATMENT NOTE (updated )  For Eval visit    Patient Name: Shila Montero    Date: 2024    : 1965  Insurance: Payor: MEDICARE / Plan: MEDICARE PART A AND B / Product Type: *No Product type* /      Patient  verified yes     Visit #   Current / Total 8 36   Time   In / Out 240 343   Total Treatment  Time  63   Pain   In / Out 2/10 2/10   Subjective Functional Status/Changes: Pt reports that she was sore after last session.      NEXT PROGRESS NOTE DUE: 24    TREATMENT AREA =  Left knee pain [M25.562]      OBJECTIVE    Modalities Rationale:     decrease edema, decrease inflammation, and decrease pain to improve patient's ability to progress to PLOF and address remaining functional goals.    H min [x] Estim Unattended, type/location: [place] Estim 10/10 to the L quad with quad set                                     []  w/ice    []  w/heat    min [] Estim Attended, type/location:                                     []  w/US     []  w/ice    []  w/heat    []  TENS insruct      min []  Mechanical Traction: type/lbs                   []  pro   []  sup   []  int   []  cont    []  before manual    []  after manual    min []  Ultrasound, settings/location:      min []  Iontophoresis w/ dexamethasone, location:                                               []  take home patch       []  in clinic    min  unbilled []  Ice     []  Heat    location/position:     min []  Paraffin,  details:     min []  Vasopneumatic Device, press/temp:     min []  Whirlpool / Fluido:    If using vaso (only need to measure limb vaso being performed on)      pre-treatment girth :       post-treatment girth :       measured at (landmark location) :      min []  Other:    Skin assessment post-treatment (if applicable):    []  intact    []  redness- no adverse reaction                 []redness - adverse reaction:      Vasopnuematic compression justification:  Per bilateral girth measures

## 2024-01-26 ENCOUNTER — HOSPITAL ENCOUNTER (OUTPATIENT)
Facility: HOSPITAL | Age: 59
Setting detail: RECURRING SERIES
Discharge: HOME | End: 2024-01-29
Payer: MEDICARE

## 2024-01-26 PROCEDURE — 97140 MANUAL THERAPY 1/> REGIONS: CPT | Performed by: PHYSICAL THERAPIST

## 2024-01-26 PROCEDURE — 97016 VASOPNEUMATIC DEVICE THERAPY: CPT | Performed by: PHYSICAL THERAPIST

## 2024-01-26 PROCEDURE — 97110 THERAPEUTIC EXERCISES: CPT | Performed by: PHYSICAL THERAPIST

## 2024-01-26 NOTE — PROGRESS NOTES
PHYSICAL / OCCUPATIONAL THERAPY - DAILY TREATMENT NOTE (updated )  For Eval visit    Patient Name: Shila Montero    Date: 2024    : 1965  Insurance: Payor: MEDICARE / Plan: MEDICARE PART A AND B / Product Type: *No Product type* /      Patient  verified yes     Visit #   Current / Total 9 36   Time   In / Out 1023 am 1118   Total Treatment  Time  55min   Pain   In / Out 1-2 1   Subjective Functional Status/Changes: Pt reports that she is very sore and hurting on inside of knee today.       NEXT PROGRESS NOTE DUE: 24    TREATMENT AREA =  Left knee pain [M25.562]      OBJECTIVE    Modalities Rationale:     decrease edema, decrease inflammation, and decrease pain to improve patient's ability to progress to PLOF and address remaining functional goals.    H min [x] Estim Unattended, type/location: [place] Estim 10/10 to the L quad with quad set                                     []  w/ice    []  w/heat    min [] Estim Attended, type/location:                                     []  w/US     []  w/ice    []  w/heat    []  TENS insruct      min []  Mechanical Traction: type/lbs                   []  pro   []  sup   []  int   []  cont    []  before manual    []  after manual    min []  Ultrasound, settings/location:      min []  Iontophoresis w/ dexamethasone, location:                                               []  take home patch       []  in clinic    min  unbilled []  Ice     []  Heat    location/position:     min []  Paraffin,  details:    10 min [x]  Vasopneumatic Device, press/temp:     min []  Whirlpool / Fluido:    If using vaso (only need to measure limb vaso being performed on)      pre-treatment girth :       post-treatment girth :       measured at (landmark location) :      min []  Other:    Skin assessment post-treatment (if applicable):    [x]  intact    []  redness- no adverse reaction                 []redness - adverse reaction:      Vasopnuematic compression justification:

## 2024-01-29 ENCOUNTER — HOSPITAL ENCOUNTER (OUTPATIENT)
Facility: HOSPITAL | Age: 59
Setting detail: RECURRING SERIES
Discharge: HOME | End: 2024-02-01
Payer: MEDICARE

## 2024-01-29 PROCEDURE — 97140 MANUAL THERAPY 1/> REGIONS: CPT | Performed by: PHYSICAL THERAPIST

## 2024-01-29 PROCEDURE — 97530 THERAPEUTIC ACTIVITIES: CPT | Performed by: PHYSICAL THERAPIST

## 2024-01-29 NOTE — PROGRESS NOTES
PHYSICAL / OCCUPATIONAL THERAPY - DAILY TREATMENT NOTE (updated )  For Eval visit    Patient Name: Shila Montero    Date: 2024    : 1965  Insurance: Payor: MEDICARE / Plan: MEDICARE PART A AND B / Product Type: *No Product type* /      Patient  verified yes     Visit #   Current / Total 10 36   Time   In / Out 1102 1150   Total Treatment  Time  48   Pain   In / Out 2-3 2   Subjective Functional Status/Changes: Pt reports 30% improvement since SOC. She reports improvements in overall pain, ability to bend her knee, strength in the L LE, and overall mobility. Functional limitations full weight-bearing through the L LE, stairs, walking, and sporting activities. Her limitations are restricted by her surgery and MD protocol.      NEXT PROGRESS NOTE DUE: 24    TREATMENT AREA =  Left knee pain [M25.562]      OBJECTIVE    Modalities Rationale:     decrease edema, decrease inflammation, and decrease pain to improve patient's ability to progress to PLOF and address remaining functional goals.    H min [x] Estim Unattended, type/location: [place] Estim 10/10 to the L quad with quad set                                     []  w/ice    []  w/heat    min [] Estim Attended, type/location:                                     []  w/US     []  w/ice    []  w/heat    []  TENS insruct      min []  Mechanical Traction: type/lbs                   []  pro   []  sup   []  int   []  cont    []  before manual    []  after manual    min []  Ultrasound, settings/location:      min []  Iontophoresis w/ dexamethasone, location:                                               []  take home patch       []  in clinic    min  unbilled []  Ice     []  Heat    location/position:     min []  Paraffin,  details:     min [x]  Vasopneumatic Device, press/temp:     min []  Whirlpool / Fluido:    If using vaso (only need to measure limb vaso being performed on)      pre-treatment girth :       post-treatment girth :       measured

## 2024-01-31 ENCOUNTER — HOSPITAL ENCOUNTER (OUTPATIENT)
Facility: HOSPITAL | Age: 59
Setting detail: RECURRING SERIES
Discharge: HOME | End: 2024-02-03
Payer: MEDICARE

## 2024-01-31 PROCEDURE — 97140 MANUAL THERAPY 1/> REGIONS: CPT | Performed by: PHYSICAL THERAPIST

## 2024-01-31 PROCEDURE — 97110 THERAPEUTIC EXERCISES: CPT | Performed by: PHYSICAL THERAPIST

## 2024-01-31 PROCEDURE — 97035 APP MDLTY 1+ULTRASOUND EA 15: CPT | Performed by: PHYSICAL THERAPIST

## 2024-01-31 NOTE — PROGRESS NOTES
or estim unattended, both untimed codes, in totals to left)  8-22 min = 1 unit; 23-37 min = 2 units; 38-52 min = 3 units; 53-67 min = 4 units; 68-82 min = 5 units   Total Total     [x]  Patient Education billed concurrently with other procedures   [x] Review HEP    [] Progressed/Changed HEP, detail:    [] Other detail:       Objective Information/Functional Measures/Assessment:  Progressed PREs per flow sheet  Improved weightbearing tolerance through the L LE as she was able to perform standing 3 way hip on the R  Noted tenderness over the L medial joint line- when pressure is applied her knee can passively move into greater flexion  PROM into L knee flexion 103 deg with pain at end range  Continue to progress per MD protocol    Patient will continue to benefit from skilled PT / OT services to modify and progress therapeutic interventions, analyze and address functional mobility deficits, analyze and address ROM deficits, analyze and address strength deficits, analyze and address soft tissue restrictions, and analyze and address imbalance/dizziness to address functional deficits and attain remaining goals.    Progress toward goals / Updated goals:  [x]  See POC   1. Pt will improve L knee AROM 0-120 deg to improve functional mobility   Status at IE -5 deg to 46 deg   Current:     2.   Pt will be able to ambulate with full weight-bearing through the L LE demonstrating improved gait  community distances without AD demonstrating normalized gait pattern.  Status at IE Partial to non weight-bearing on the L with B axillary crutches.  Current:     3.  Pt will improve L knee strength to 5/5 to improve stairs and functional squat without pain.   Status at IE NT per MD protocol      4.  Pt will improve FOTO score to at least 57/100 as a functional indicator of improved mobility.  Status at IE 26/100   Current:     PLAN  yes Continue plan of care  [x]  Upgrade activities as tolerated  []  Discharge due to :  [x]

## 2024-01-31 NOTE — PROGRESS NOTES
[facility]  [address] [place] [address]  Phone: [phone]   [phone]  PHYSICAL THERAPY PROGRESS NOTE  Patient Name: Shila Montero : 1965   Treatment/Medical Diagnosis: Left knee pain [M25.562]   Referral Source: Sierra Brandon PA     Date of Initial Visit: 24 Attended Visits: 10 Missed Visits: 0     SUMMARY OF TREATMENT  PT seen for 10 PT visits sp L ACL repair on 24. Therapy has closely followed MD protocol.     CURRENT STATUS  Pt reports 30% improvement since SOC. She reports improvements in overall pain, ability to bend her knee, strength in the L LE, and overall mobility. Functional limitations full weight-bearing through the L LE, stairs, walking, and sporting activities. Her limitations are restricted by her surgery and MD protocol.  Upon reassessment, pt presents with increased pain in the L knee limiting tolerance to therex and full PROM of the L knee. She has improved quad strength noted with ability to perform SLR without lag. Continue PT to further improve L knee AROM/strength to be able to progress to full weight-bearing without AD and brace.    PROGRESS TOWARDS GOALS:   Pt will be IND and compliant with HEP for self-management of symptoms.   Status at IE goal initiated   Current: pt reports compliance 24     2.  Pt will improve L knee AROM 0-90 deg to improve functional mobility   Status at IE -5 deg to 46 deg   Current: PROM 90 deg 24 with pain at end range     3.  Pt will be able to ambulate with full weight-bearing through the L LE demonstrating improved gait  using B axillary crutches.  Status at IE Partial to non weight-bearing on the L with B axillary crutches.  Current: WBAT with R axillary crutch on the R 24     4.  PT will be able to perform a SLR with brace on the L without quad lag to progress to ambulation without brace and crutches.  Status at IE NT

## 2024-02-02 ENCOUNTER — HOSPITAL ENCOUNTER (OUTPATIENT)
Facility: HOSPITAL | Age: 59
Setting detail: RECURRING SERIES
Discharge: HOME | End: 2024-02-05
Payer: MEDICARE

## 2024-02-02 PROCEDURE — 97110 THERAPEUTIC EXERCISES: CPT | Performed by: GENERAL ACUTE CARE HOSPITAL

## 2024-02-02 PROCEDURE — G0283 ELEC STIM OTHER THAN WOUND: HCPCS | Performed by: GENERAL ACUTE CARE HOSPITAL

## 2024-02-02 PROCEDURE — 97035 APP MDLTY 1+ULTRASOUND EA 15: CPT | Performed by: GENERAL ACUTE CARE HOSPITAL

## 2024-02-02 PROCEDURE — 97530 THERAPEUTIC ACTIVITIES: CPT | Performed by: GENERAL ACUTE CARE HOSPITAL

## 2024-02-02 NOTE — PROGRESS NOTES
care  [x]  Upgrade activities as tolerated  []  Discharge due to :  [x]  Other: progress as tolerated nv     Eloisa Villagomez, PT    2/2/2024    11:52 AM    Future Appointments   Date Time Provider Department Center   2/5/2024  3:20 PM Laura Humphreys, PT [facility]   2/8/2024  2:40 PM Laura Humphreys, PT [facility]   2/13/2024 11:00 AM Laura Humphreys, PT [facility]   2/15/2024 11:00 AM Laura Humphreys, PT [facility]   2/20/2024 11:00 AM Laura Humphreys, PT [facility]   2/22/2024 11:40 AM Laura Humphreys, PT [facility]   2/27/2024 11:00 AM Laura Humphreys, PT [facility]   2/29/2024 10:20 AM Laura Humphreys, PT [facility]

## 2024-02-05 ENCOUNTER — HOSPITAL ENCOUNTER (OUTPATIENT)
Facility: HOSPITAL | Age: 59
Setting detail: RECURRING SERIES
Discharge: HOME | End: 2024-02-08
Payer: MEDICARE

## 2024-02-05 PROCEDURE — 97110 THERAPEUTIC EXERCISES: CPT | Performed by: PHYSICAL THERAPIST

## 2024-02-05 PROCEDURE — 97530 THERAPEUTIC ACTIVITIES: CPT | Performed by: PHYSICAL THERAPIST

## 2024-02-05 NOTE — PROGRESS NOTES
PHYSICAL / OCCUPATIONAL THERAPY - DAILY TREATMENT NOTE (updated )  For Eval visit    Patient Name: Shila Montero    Date: 2024    : 1965  Insurance: Payor: MEDICARE / Plan: MEDICARE PART A AND B / Product Type: *No Product type* /      Patient  verified yes     Visit #   Current / Total 13 36   Time   In / Out 322 410   Total Treatment  Time  48   Pain   In / Out 1-2  0    Subjective Functional Status/Changes: No new changes reported.       NEXT PROGRESS NOTE DUE: 24    TREATMENT AREA =  Left knee pain [M25.562]      OBJECTIVE    Modalities Rationale:     decrease edema, decrease inflammation, and decrease pain to improve patient's ability to progress to PLOF and address remaining functional goals.    H min [x] Estim Unattended, type/location: [place] Estim 10/10 to the L quad with quad set                                     []  w/ice    []  w/heat    min [] Estim Attended, type/location:                                     []  w/US     []  w/ice    []  w/heat    []  TENS insruct      min []  Mechanical Traction: type/lbs                   []  pro   []  sup   []  int   []  cont    []  before manual    []  after manual   8  min [x]  Ultrasound, settings/location:  1.0 W/cm2, 1.0% to the L medial knee    min []  Iontophoresis w/ dexamethasone, location:                                               []  take home patch       []  in clinic    min  unbilled []  Ice     []  Heat    location/position:     min []  Paraffin,  details:     min [x]  Vasopneumatic Device, press/temp:     min []  Whirlpool / Fluido:    If using vaso (only need to measure limb vaso being performed on)      pre-treatment girth :       post-treatment girth :       measured at (landmark location) :      min []  Other:    Skin assessment post-treatment (if applicable):    [x]  intact    []  redness- no adverse reaction                 []redness - adverse reaction:      Vasopnuematic compression justification:  Per

## 2024-02-08 ENCOUNTER — APPOINTMENT (OUTPATIENT)
Facility: HOSPITAL | Age: 59
End: 2024-02-08
Payer: MEDICARE

## 2024-02-13 ENCOUNTER — HOSPITAL ENCOUNTER (OUTPATIENT)
Facility: HOSPITAL | Age: 59
Setting detail: RECURRING SERIES
Discharge: HOME | End: 2024-02-16
Payer: MEDICARE

## 2024-02-13 PROCEDURE — 97140 MANUAL THERAPY 1/> REGIONS: CPT | Performed by: PHYSICAL THERAPIST

## 2024-02-13 PROCEDURE — 97035 APP MDLTY 1+ULTRASOUND EA 15: CPT | Performed by: PHYSICAL THERAPIST

## 2024-02-13 PROCEDURE — G0283 ELEC STIM OTHER THAN WOUND: HCPCS | Performed by: PHYSICAL THERAPIST

## 2024-02-13 NOTE — PROGRESS NOTES
PHYSICAL / OCCUPATIONAL THERAPY - DAILY TREATMENT NOTE (updated )  For Eval visit    Patient Name: Shila Montero    Date: 2024    : 1965  Insurance: Payor: MEDICARE / Plan: MEDICARE PART A AND B / Product Type: *No Product type* /      Patient  verified yes     Visit #   Current / Total 14 36   Time   In / Out 1104 1204   Total Treatment  Time  60   Pain   In / Out 1  0    Subjective Functional Status/Changes: Pt notes that she saw her MD this morning. She reports that she unlocked her brace to 20 deg for ambulation, she does not have to wear the brace to sleep. She can stop using the crutch around the house but in public still must use it. She has a lot of scar tissue and that is what is causing her pain when she bends her knee.        NEXT PROGRESS NOTE DUE: 24    TREATMENT AREA =  Left knee pain [M25.562]      OBJECTIVE    Modalities Rationale:     decrease edema, decrease inflammation, and decrease pain to improve patient's ability to progress to PLOF and address remaining functional goals.    15 min [x] Estim Unattended, type/location: IFC to the L knee with ice in supine                                    []  w/ice    []  w/heat    min [] Estim Attended, type/location:                                     []  w/US     []  w/ice    []  w/heat    []  TENS insruct      min []  Mechanical Traction: type/lbs                   []  pro   []  sup   []  int   []  cont    []  before manual    []  after manual   8  min [x]  Ultrasound, settings/location:  1.0 W/cm2, 1.0% to the L medial knee    min []  Iontophoresis w/ dexamethasone, location:                                               []  take home patch       []  in clinic    min  unbilled []  Ice     []  Heat    location/position:     min []  Paraffin,  details:     min [x]  Vasopneumatic Device, press/temp:     min []  Whirlpool / Fluido:    If using vaso (only need to measure limb vaso being performed on)      pre-treatment girth :

## 2024-02-15 ENCOUNTER — HOSPITAL ENCOUNTER (OUTPATIENT)
Facility: HOSPITAL | Age: 59
Setting detail: RECURRING SERIES
Discharge: HOME | End: 2024-02-18
Payer: MEDICARE

## 2024-02-15 PROCEDURE — 97035 APP MDLTY 1+ULTRASOUND EA 15: CPT | Performed by: PHYSICAL THERAPIST

## 2024-02-15 PROCEDURE — 97140 MANUAL THERAPY 1/> REGIONS: CPT | Performed by: PHYSICAL THERAPIST

## 2024-02-15 PROCEDURE — 97530 THERAPEUTIC ACTIVITIES: CPT | Performed by: PHYSICAL THERAPIST

## 2024-02-15 NOTE — PROGRESS NOTES
PHYSICAL / OCCUPATIONAL THERAPY - DAILY TREATMENT NOTE (updated )  For Eval visit    Patient Name: Shila Montero    Date: 2/15/2024    : 1965  Insurance: Payor: MEDICARE / Plan: MEDICARE PART A AND B / Product Type: *No Product type* /      Patient  verified yes     Visit #   Current / Total 15 36   Time   In / Out 1100 1208   Total Treatment  Time  68   Pain   In / Out 1  0    Subjective Functional Status/Changes: Pt notes that she saw her MD this morning. She reports that she unlocked her brace to 20 deg for ambulation, she does not have to wear the brace to sleep. She can stop using the crutch around the house but in public still must use it. She has a lot of scar tissue and that is what is causing her pain when she bends her knee.        NEXT PROGRESS NOTE DUE: 24    TREATMENT AREA =  Left knee pain [M25.562]      OBJECTIVE    Modalities Rationale:     decrease edema, decrease inflammation, and decrease pain to improve patient's ability to progress to PLOF and address remaining functional goals.    H min [x] Estim Unattended, type/location: IFC to the L knee with ice in supine                                    []  w/ice    []  w/heat    min [] Estim Attended, type/location:                                     []  w/US     []  w/ice    []  w/heat    []  TENS insruct      min []  Mechanical Traction: type/lbs                   []  pro   []  sup   []  int   []  cont    []  before manual    []  after manual   8  min [x]  Ultrasound, settings/location:  1.0 W/cm2, 1.0% to the L medial knee    min []  Iontophoresis w/ dexamethasone, location:                                               []  take home patch       []  in clinic   15 min  unbilled [x]  Ice     []  Heat    location/position:  Supine to the L knee     min []  Paraffin,  details:     min [x]  Vasopneumatic Device, press/temp:     min []  Whirlpool / Fluido:    If using vaso (only need to measure limb vaso being performed on)

## 2024-02-20 ENCOUNTER — HOSPITAL ENCOUNTER (OUTPATIENT)
Facility: HOSPITAL | Age: 59
Setting detail: RECURRING SERIES
Discharge: HOME | End: 2024-02-23
Payer: MEDICARE

## 2024-02-20 PROCEDURE — G0283 ELEC STIM OTHER THAN WOUND: HCPCS | Performed by: PHYSICAL THERAPIST

## 2024-02-20 PROCEDURE — 97530 THERAPEUTIC ACTIVITIES: CPT | Performed by: PHYSICAL THERAPIST

## 2024-02-20 PROCEDURE — 97110 THERAPEUTIC EXERCISES: CPT | Performed by: PHYSICAL THERAPIST

## 2024-02-20 PROCEDURE — 97140 MANUAL THERAPY 1/> REGIONS: CPT | Performed by: PHYSICAL THERAPIST

## 2024-02-20 NOTE — PROGRESS NOTES
PHYSICAL / OCCUPATIONAL THERAPY - DAILY TREATMENT NOTE (updated )  For Eval visit    Patient Name: Shila Montero    Date: 2024    : 1965  Insurance: Payor: MEDICARE / Plan: MEDICARE PART A AND B / Product Type: *No Product type* /      Patient  verified yes     Visit #   Current / Total 16 36   Time   In / Out 1100 1212   Total Treatment  Time  72   Pain   In / Out 4-5 0   Subjective Functional Status/Changes: Pt reports that she does not use her crutch at home anymore. She reports that she uses it at night when she doesn't wear her brace at night.      NEXT PROGRESS NOTE DUE: 24    TREATMENT AREA =  Left knee pain [M25.562]      OBJECTIVE    Modalities Rationale:     decrease edema, decrease inflammation, and decrease pain to improve patient's ability to progress to PLOF and address remaining functional goals.    15 min [x] Estim Unattended, type/location: IFC to the L knee with ice in supine                                    []  w/ice    []  w/heat    min [] Estim Attended, type/location:                                     []  w/US     []  w/ice    []  w/heat    []  TENS insruct      min []  Mechanical Traction: type/lbs                   []  pro   []  sup   []  int   []  cont    []  before manual    []  after manual   H min [x]  Ultrasound, settings/location:  1.0 W/cm2, 1.0% to the L medial knee    min []  Iontophoresis w/ dexamethasone, location:                                               []  take home patch       []  in clinic   H min  unbilled [x]  Ice     []  Heat    location/position:  Supine to the L knee     min []  Paraffin,  details:     min [x]  Vasopneumatic Device, press/temp:     min []  Whirlpool / Fluido:    If using vaso (only need to measure limb vaso being performed on)      pre-treatment girth :       post-treatment girth :       measured at (landmark location) :      min []  Other:    Skin assessment post-treatment (if applicable):    [x]  intact    []

## 2024-02-22 ENCOUNTER — HOSPITAL ENCOUNTER (OUTPATIENT)
Facility: HOSPITAL | Age: 59
Setting detail: RECURRING SERIES
Discharge: HOME | End: 2024-02-25
Payer: MEDICARE

## 2024-02-22 PROCEDURE — 97110 THERAPEUTIC EXERCISES: CPT | Performed by: PHYSICAL THERAPIST

## 2024-02-22 PROCEDURE — 97140 MANUAL THERAPY 1/> REGIONS: CPT | Performed by: PHYSICAL THERAPIST

## 2024-02-22 PROCEDURE — 97530 THERAPEUTIC ACTIVITIES: CPT | Performed by: PHYSICAL THERAPIST

## 2024-02-22 NOTE — PROGRESS NOTES
Vasopnuematic compression justification:  Per bilateral girth measures taken and listed above the edema is considered significant and having an impact on the patient's strength, balance, gait, transfers, self care, and ADL's       Therapeutic Procedures:  Tx Min Billable or 1:1 Min (if diff from Tx Min) Procedure, Rationale, Specifics   15 15 [id number] Manual Therapy (timed):  decrease pain, increase ROM, increase tissue extensibility, and bandage removal to improve patient's ability to progress to PLOF and address remaining functional goals.  The manual therapy interventions were performed at a separate and distinct time from the therapeutic activities interventions . (see flow sheet as applicable)     Details if applicable:    Patellar mobs  Gentle PROM  to the L knee into flexion  Ice massage with XFM to the medial incision to reduce scar tissue       76305 Neuromuscular Re-Education (timed):  improve balance, coordination, kinesthetic sense, posture, core stability and proprioception to improve patient's ability to develop conscious control of individual muscles and awareness of position of extremities in order to progress to PLOF and address remaining functional goals. (see flow sheet as applicable)        10 10 57049 Therapeutic Activity (timed):  use of dynamic activities replicating functional movements to increase ROM, strength, coordination, balance, and proprioception in order to improve patient's ability to progress to PLOF and address remaining functional goals.  (see flow sheet as applicable)      Step taps 6\"   Step ups6\"  HR/TR   35 20  70228 Therapeutic Exercise (timed):  increase ROM, strength, coordination, balance, and proprioception to improve patient's ability to progress to PLOF and address remaining functional goals. (see flow sheet as applicable)     Details if applicable:    Bike   Standing HS stretch  Incline stretch  3 way hip  [id number]   60 45 MC BC Totals Reminder: bill using total billable

## 2024-02-27 ENCOUNTER — HOSPITAL ENCOUNTER (OUTPATIENT)
Facility: HOSPITAL | Age: 59
Setting detail: RECURRING SERIES
Discharge: HOME | End: 2024-03-01
Payer: MEDICARE

## 2024-02-27 PROCEDURE — 97140 MANUAL THERAPY 1/> REGIONS: CPT | Performed by: PHYSICAL THERAPIST

## 2024-02-27 PROCEDURE — 97110 THERAPEUTIC EXERCISES: CPT | Performed by: PHYSICAL THERAPIST

## 2024-02-27 PROCEDURE — 97530 THERAPEUTIC ACTIVITIES: CPT | Performed by: PHYSICAL THERAPIST

## 2024-02-27 PROCEDURE — 97035 APP MDLTY 1+ULTRASOUND EA 15: CPT | Performed by: PHYSICAL THERAPIST

## 2024-02-27 NOTE — PROGRESS NOTES
PHYSICAL / OCCUPATIONAL THERAPY - DAILY TREATMENT NOTE (updated )  For Eval visit    Patient Name: Shila Montero    Date: 2024    : 1965  Insurance: Payor: MEDICARE / Plan: MEDICARE PART A AND B / Product Type: *No Product type* /      Patient  verified yes     Visit #   Current / Total 18 36   Time   In / Out 1105 1222   Total Treatment  Time  77   Pain   In / Out 1-2 0   Subjective Functional Status/Changes: Reduced pain this session. Continues to have pain ascending stairs.     NEXT PROGRESS NOTE DUE: 24    TREATMENT AREA =  Left knee pain [M25.562]      OBJECTIVE    Modalities Rationale:     decrease edema, decrease inflammation, and decrease pain to improve patient's ability to progress to PLOF and address remaining functional goals.    H min [x] Estim Unattended, type/location: IFC to the L knee with ice in supine                                    []  w/ice    []  w/heat    min [] Estim Attended, type/location:                                     []  w/US     []  w/ice    []  w/heat    []  TENS insruct      min []  Mechanical Traction: type/lbs                   []  pro   []  sup   []  int   []  cont    []  before manual    []  after manual   8 min [x]  Ultrasound, settings/location:  1.0 W/cm2, 1.0% to the L medial knee    min []  Iontophoresis w/ dexamethasone, location:                                               []  take home patch       []  in clinic   H min  unbilled [x]  Ice     []  Heat    location/position:  Supine to the L knee     min []  Paraffin,  details:     min [x]  Vasopneumatic Device, press/temp:     min []  Whirlpool / Fluido:    If using vaso (only need to measure limb vaso being performed on)      pre-treatment girth :       post-treatment girth :       measured at (landmark location) :      min []  Other:    Skin assessment post-treatment (if applicable):    [x]  intact    []  redness- no adverse reaction                 []redness - adverse reaction:

## 2024-02-29 ENCOUNTER — HOSPITAL ENCOUNTER (OUTPATIENT)
Facility: HOSPITAL | Age: 59
Setting detail: RECURRING SERIES
End: 2024-02-29
Payer: MEDICARE

## 2024-02-29 PROCEDURE — 97530 THERAPEUTIC ACTIVITIES: CPT | Performed by: PHYSICAL THERAPIST

## 2024-02-29 PROCEDURE — 97140 MANUAL THERAPY 1/> REGIONS: CPT | Performed by: PHYSICAL THERAPIST

## 2024-02-29 PROCEDURE — 97110 THERAPEUTIC EXERCISES: CPT | Performed by: PHYSICAL THERAPIST

## 2024-02-29 NOTE — PROGRESS NOTES
[facility]  [address] [place] [address]  Phone: [phone]   [phone]  PHYSICAL THERAPY PROGRESS NOTE  Patient Name: Shila Montero : 1965   Treatment/Medical Diagnosis: Left knee pain [M25.562]   Referral Source: Sierra Brandon PA     Date of Initial Visit: 24 Attended Visits: 19 Missed Visits: 0     SUMMARY OF TREATMENT  PT seen for 19 PT visits sp L ACL repair on 24. Therapy has closely followed MD protocol.     CURRENT STATUS  Pt reports 80% improvement of symptoms. She reports improvements in overall flexibility, weight-bearing tolerance, and strength. Functional limitations include walking long periods of time, walking her dogs, biking, and playing [employer].  Upon reassessment, pt presents with improvements in L knee mobility and strength however, continues to have pain over the L patellar tendon with steps. She is able to ambulate without AD however, continues to use the brace due to pain in the knee. She has improved Quad control on the L. She would benefit from continued therapy to further improve overall strength and stability within MD protocol to return to PLOF symptom free.     PROGRESS TOWARDS GOALS:   1. Pt will improve L knee AROM 0-120 deg to improve functional mobility   Status at IE -5 deg to 46 deg   Current: AROM 0-122 deg 24     2.   Pt will be able to ambulate with full weight-bearing through the L LE demonstrating improved gait  community distances without AD demonstrating normalized gait pattern.  Status at IE Partial to non weight-bearing on the L with B axillary crutches.  Current: pt ambulates into clinic without AD demonstrating reduced knee flexion as brace is locked to 90 deg 24     3.  Pt will improve L knee strength to 5/5 to improve stairs and functional squat without pain.   Status at IE NT per MD protocol  Current: not formally assessed per MD

## 2024-02-29 NOTE — PROGRESS NOTES
PHYSICAL / OCCUPATIONAL THERAPY - DAILY TREATMENT NOTE (updated )  For Eval visit    Patient Name: Shila Montero    Date: 2024    : 1965  Insurance: Payor: MEDICARE / Plan: MEDICARE PART A AND B / Product Type: *No Product type* /      Patient  verified yes     Visit #   Current / Total 19 36   Time   In / Out 1020 1103   Total Treatment  Time  43   Pain   In / Out 2 0   Subjective Functional Status/Changes: Pt reports 80% improvement of symptoms. She reports improvements in overall flexibility, weight-bearing tolerance, and strength. Functional limitations include walking long periods of time, walking her dogs, biking, and playing corn hole.      NEXT PROGRESS NOTE DUE: 3/29/24    TREATMENT AREA =  Left knee pain [M25.562]      OBJECTIVE    Modalities Rationale:     decrease edema, decrease inflammation, and decrease pain to improve patient's ability to progress to PLOF and address remaining functional goals.    H min [x] Estim Unattended, type/location: IFC to the L knee with ice in supine                                    []  w/ice    []  w/heat    min [] Estim Attended, type/location:                                     []  w/US     []  w/ice    []  w/heat    []  TENS insruct      min []  Mechanical Traction: type/lbs                   []  pro   []  sup   []  int   []  cont    []  before manual    []  after manual   H min [x]  Ultrasound, settings/location:  1.0 W/cm2, 1.0% to the L medial knee    min []  Iontophoresis w/ dexamethasone, location:                                               []  take home patch       []  in clinic   H min  unbilled [x]  Ice     []  Heat    location/position:  Supine to the L knee     min []  Paraffin,  details:     min [x]  Vasopneumatic Device, press/temp:     min []  Whirlpool / Fluido:    If using vaso (only need to measure limb vaso being performed on)      pre-treatment girth :       post-treatment girth :       measured at (landmark location) :       min []  Other:    Skin assessment post-treatment (if applicable):    [x]  intact    []  redness- no adverse reaction                 []redness - adverse reaction:      Vasopnuematic compression justification:  Per bilateral girth measures taken and listed above the edema is considered significant and having an impact on the patient's strength, balance, gait, transfers, self care, and ADL's       Therapeutic Procedures:  Tx Min Billable or 1:1 Min (if diff from Tx Min) Procedure, Rationale, Specifics   8  41552 Manual Therapy (timed):  decrease pain, increase ROM, increase tissue extensibility, and bandage removal to improve patient's ability to progress to PLOF and address remaining functional goals.  The manual therapy interventions were performed at a separate and distinct time from the therapeutic activities interventions . (see flow sheet as applicable)     Details if applicable:    K-tape for patellar stabilization       04092 Neuromuscular Re-Education (timed):  improve balance, coordination, kinesthetic sense, posture, core stability and proprioception to improve patient's ability to develop conscious control of individual muscles and awareness of position of extremities in order to progress to PLOF and address remaining functional goals. (see flow sheet as applicable)        27  41504 Therapeutic Activity (timed):  use of dynamic activities replicating functional movements to increase ROM, strength, coordination, balance, and proprioception in order to improve patient's ability to progress to PLOF and address remaining functional goals.  (see flow sheet as applicable)    minisquats  Step ups6\" side step ups  PT reassessment  FOTO   8   42489 Therapeutic Exercise (timed):  increase ROM, strength, coordination, balance, and proprioception to improve patient's ability to progress to PLOF and address remaining functional goals. (see flow sheet as applicable)     Details if applicable:    Bike   Standing HS

## 2024-03-05 ENCOUNTER — HOSPITAL ENCOUNTER (OUTPATIENT)
Facility: HOSPITAL | Age: 59
Setting detail: RECURRING SERIES
Discharge: HOME | End: 2024-03-08
Payer: MEDICARE

## 2024-03-05 PROCEDURE — G0283 ELEC STIM OTHER THAN WOUND: HCPCS | Performed by: PHYSICAL THERAPIST

## 2024-03-05 PROCEDURE — 97110 THERAPEUTIC EXERCISES: CPT | Performed by: PHYSICAL THERAPIST

## 2024-03-05 PROCEDURE — 97530 THERAPEUTIC ACTIVITIES: CPT | Performed by: PHYSICAL THERAPIST

## 2024-03-05 NOTE — PROGRESS NOTES
PHYSICAL / OCCUPATIONAL THERAPY - DAILY TREATMENT NOTE (updated )  For Eval visit    Patient Name: Shila Montero    Date: 3/5/2024    : 1965  Insurance: Payor: MEDICARE / Plan: MEDICARE PART A AND B / Product Type: *No Product type* /      Patient  verified yes     Visit #   Current / Total 20 36   Time   In / Out 1100 1207   Total Treatment  Time  67   Pain   In / Out 1-2 0   Subjective Functional Status/Changes: Pt reports that she was able to walk without the brace a little bit over the weekend.      NEXT PROGRESS NOTE DUE: 3/29/24    TREATMENT AREA =  Left knee pain [M25.562]      OBJECTIVE    Modalities Rationale:     decrease edema, decrease inflammation, and decrease pain to improve patient's ability to progress to PLOF and address remaining functional goals.    15 min [x] Estim Unattended, type/location: IFC to the L knee with ice in supine                                    []  w/ice    []  w/heat    min [] Estim Attended, type/location:                                     []  w/US     []  w/ice    []  w/heat    []  TENS insruct      min []  Mechanical Traction: type/lbs                   []  pro   []  sup   []  int   []  cont    []  before manual    []  after manual   H min [x]  Ultrasound, settings/location:  1.0 W/cm2, 1.0% to the L medial knee    min []  Iontophoresis w/ dexamethasone, location:                                               []  take home patch       []  in clinic   H min  unbilled [x]  Ice     []  Heat    location/position:  Supine to the L knee     min []  Paraffin,  details:     min [x]  Vasopneumatic Device, press/temp:     min []  Whirlpool / Fluido:    If using vaso (only need to measure limb vaso being performed on)      pre-treatment girth :       post-treatment girth :       measured at (landmark location) :      min []  Other:    Skin assessment post-treatment (if applicable):    [x]  intact    []  redness- no adverse reaction                 []redness -

## 2024-03-07 ENCOUNTER — HOSPITAL ENCOUNTER (OUTPATIENT)
Facility: HOSPITAL | Age: 59
Setting detail: RECURRING SERIES
Discharge: HOME | End: 2024-03-10
Payer: MEDICARE

## 2024-03-07 PROCEDURE — 97530 THERAPEUTIC ACTIVITIES: CPT | Performed by: PHYSICAL THERAPIST

## 2024-03-07 PROCEDURE — 97140 MANUAL THERAPY 1/> REGIONS: CPT | Performed by: PHYSICAL THERAPIST

## 2024-03-07 PROCEDURE — 97110 THERAPEUTIC EXERCISES: CPT | Performed by: PHYSICAL THERAPIST

## 2024-03-07 NOTE — PROGRESS NOTES
57/100  Current:      4. Pt will be able to ascend/descend 4 therapy stairs without pain in the L knee to return to PLOF symptoms.   Status at reassessment (2/29/24) She is able to ascend/descend 4 therapy steps 6\" however, has increased pain with ascending in the patellar tendon     PLAN  yes Continue plan of care  [x]  Upgrade activities as tolerated  []  Discharge due to :  [x]  Other: check goals NV     Laura Humphreys PT    3/7/2024    11:37 AM    Future Appointments   Date Time Provider Department Center   3[facility]12[facility]2024 11:00 [facility]   3/14/2024 11:00 AM [facility]   3/19/2024 11:00 AM Laura Humphreys, PT [facility]   3/21/2024 11:00 AM Laura Humphreys PT [facility]   3/26/2024 11:00 AM Laura Humphreys PT [facility]   3/28/2024 11:00 AM Laura Humphreys, PT [facility]

## 2024-03-12 ENCOUNTER — HOSPITAL ENCOUNTER (OUTPATIENT)
Facility: HOSPITAL | Age: 59
Setting detail: RECURRING SERIES
Discharge: HOME | End: 2024-03-15
Payer: MEDICARE

## 2024-03-12 PROCEDURE — 97110 THERAPEUTIC EXERCISES: CPT | Performed by: PHYSICAL THERAPIST

## 2024-03-12 PROCEDURE — 97140 MANUAL THERAPY 1/> REGIONS: CPT | Performed by: PHYSICAL THERAPIST

## 2024-03-12 PROCEDURE — 97035 APP MDLTY 1+ULTRASOUND EA 15: CPT | Performed by: PHYSICAL THERAPIST

## 2024-03-12 PROCEDURE — 97530 THERAPEUTIC ACTIVITIES: CPT | Performed by: PHYSICAL THERAPIST

## 2024-03-12 NOTE — PROGRESS NOTES
PHYSICAL / OCCUPATIONAL THERAPY - DAILY TREATMENT NOTE (updated )  For Eval visit    Patient Name: Shila Montero    Date: 3/12/2024    : 1965  Insurance: Payor: MEDICARE / Plan: MEDICARE PART A AND B / Product Type: *No Product type* /      Patient  verified yes     Visit #   Current / Total 22 36   Time   In / Out 1100 1158   Total Treatment  Time  58   Pain   In / Out 1-2 1   Subjective Functional Status/Changes: Pt reports increased soreness secondary as she has started really weaning from the brace. Noted pin point tenderness on the medial patella     NEXT PROGRESS NOTE DUE: 3/29/24    TREATMENT AREA =  Left knee pain [M25.562]      OBJECTIVE    Modalities Rationale:     decrease edema, decrease inflammation, and decrease pain to improve patient's ability to progress to PLOF and address remaining functional goals.    PD min [x] Estim Unattended, type/location: IFC to the L knee with ice in supine                                    []  w/ice    []  w/heat    min [] Estim Attended, type/location:                                     []  w/US     []  w/ice    []  w/heat    []  TENS insruct      min []  Mechanical Traction: type/lbs                   []  pro   []  sup   []  int   []  cont    []  before manual    []  after manual   8 min [x]  Ultrasound, settings/location:  1.0 W/cm2, 1.0% to the L medial knee    min []  Iontophoresis w/ dexamethasone, location:                                               []  take home patch       []  in clinic   H min  unbilled [x]  Ice     []  Heat    location/position:  Supine to the L knee     min []  Paraffin,  details:     min [x]  Vasopneumatic Device, press/temp:     min []  Whirlpool / Fluido:    If using vaso (only need to measure limb vaso being performed on)      pre-treatment girth :       post-treatment girth :       measured at (landmark location) :      min []  Other:    Skin assessment post-treatment (if applicable):    [x]  intact    []  redness-

## 2024-03-14 ENCOUNTER — HOSPITAL ENCOUNTER (OUTPATIENT)
Facility: HOSPITAL | Age: 59
Setting detail: RECURRING SERIES
Discharge: HOME | End: 2024-03-17
Payer: MEDICARE

## 2024-03-14 PROCEDURE — 97140 MANUAL THERAPY 1/> REGIONS: CPT | Performed by: PHYSICAL THERAPIST

## 2024-03-14 PROCEDURE — 97530 THERAPEUTIC ACTIVITIES: CPT | Performed by: PHYSICAL THERAPIST

## 2024-03-14 NOTE — PROGRESS NOTES
PHYSICAL / OCCUPATIONAL THERAPY - DAILY TREATMENT NOTE (updated )  For Eval visit    Patient Name: Shila Montero    Date: 3/14/2024    : 1965  Insurance: Payor: MEDICARE / Plan: MEDICARE PART A AND B / Product Type: *No Product type* /      Patient  verified yes     Visit #   Current / Total 23 36   Time   In / Out 1101 1158   Total Treatment  Time  57   Pain   In / Out 1-2 1   Subjective Functional Status/Changes: Pt reports that her knee is really bothering her. She notes that yesterday her knee hurt so bad she had difficulty standing and going up the stairs.      NEXT PROGRESS NOTE DUE: 3/29/24    TREATMENT AREA =  Left knee pain [M25.562]      OBJECTIVE    Modalities Rationale:     decrease edema, decrease inflammation, and decrease pain to improve patient's ability to progress to PLOF and address remaining functional goals.    PD min [x] Estim Unattended, type/location: IFC to the L knee with ice in supine                                    []  w/ice    []  w/heat    min [] Estim Attended, type/location:                                     []  w/US     []  w/ice    []  w/heat    []  TENS insruct      min []  Mechanical Traction: type/lbs                   []  pro   []  sup   []  int   []  cont    []  before manual    []  after manual   H min [x]  Ultrasound, settings/location:  1.0 W/cm2, 1.0% to the L medial knee    min []  Iontophoresis w/ dexamethasone, location:                                               []  take home patch       []  in clinic   H min  unbilled [x]  Ice     []  Heat    location/position:  Supine to the L knee     min []  Paraffin,  details:     min [x]  Vasopneumatic Device, press/temp:     min []  Whirlpool / Fluido:    If using vaso (only need to measure limb vaso being performed on)      pre-treatment girth :       post-treatment girth :       measured at (landmark location) :     5 min []  Other:  Cold laser 8J/cm2 to the incisions of the L knee to break up scar

## 2024-03-19 ENCOUNTER — HOSPITAL ENCOUNTER (OUTPATIENT)
Facility: HOSPITAL | Age: 59
Setting detail: RECURRING SERIES
Discharge: HOME | End: 2024-03-22
Payer: MEDICARE

## 2024-03-19 PROCEDURE — 97530 THERAPEUTIC ACTIVITIES: CPT | Performed by: PHYSICAL THERAPIST

## 2024-03-19 PROCEDURE — 97110 THERAPEUTIC EXERCISES: CPT | Performed by: PHYSICAL THERAPIST

## 2024-03-19 PROCEDURE — 97140 MANUAL THERAPY 1/> REGIONS: CPT | Performed by: PHYSICAL THERAPIST

## 2024-03-19 NOTE — PROGRESS NOTES
PHYSICAL / OCCUPATIONAL THERAPY - DAILY TREATMENT NOTE (updated )  For Eval visit    Patient Name: Shila Montero    Date: 3/19/2024    : 1965  Insurance: Payor: MEDICARE / Plan: MEDICARE PART A AND B / Product Type: *No Product type* /      Patient  verified yes     Visit #   Current / Total 24 36   Time   In / Out 1100 1158   Total Treatment  Time  58   Pain   In / Out 1-2 1   Subjective Functional Status/Changes: Pt reports that she did a deep scar release yesterday and her knee is sore today but she thinks she has more mobility.      NEXT PROGRESS NOTE DUE: 3/29/24    TREATMENT AREA =  Left knee pain [M25.562]      OBJECTIVE    Modalities Rationale:     decrease edema, decrease inflammation, and decrease pain to improve patient's ability to progress to PLOF and address remaining functional goals.    PD min [x] Estim Unattended, type/location: IFC to the L knee with ice in supine                                    []  w/ice    []  w/heat    min [] Estim Attended, type/location:                                     []  w/US     []  w/ice    []  w/heat    []  TENS insruct      min []  Mechanical Traction: type/lbs                   []  pro   []  sup   []  int   []  cont    []  before manual    []  after manual   H min [x]  Ultrasound, settings/location:  1.0 W/cm2, 1.0% to the L medial knee    min []  Iontophoresis w/ dexamethasone, location:                                               []  take home patch       []  in clinic   H min  unbilled [x]  Ice     []  Heat    location/position:  Supine to the L knee     min []  Paraffin,  details:     min [x]  Vasopneumatic Device, press/temp:     min []  Whirlpool / Fluido:    If using vaso (only need to measure limb vaso being performed on)      pre-treatment girth :       post-treatment girth :       measured at (landmark location) :     5 min []  Other:  Cold laser 8J/cm2 to the incisions of the L knee to break up scar tissue and promote heeling to

## 2024-03-21 ENCOUNTER — HOSPITAL ENCOUNTER (OUTPATIENT)
Facility: HOSPITAL | Age: 59
Setting detail: RECURRING SERIES
Discharge: HOME | End: 2024-03-24
Payer: MEDICARE

## 2024-03-21 PROCEDURE — 97530 THERAPEUTIC ACTIVITIES: CPT | Performed by: PHYSICAL THERAPIST

## 2024-03-21 PROCEDURE — 97110 THERAPEUTIC EXERCISES: CPT | Performed by: PHYSICAL THERAPIST

## 2024-03-21 NOTE — PROGRESS NOTES
balance, and proprioception to improve patient's ability to progress to PLOF and address remaining functional goals. (see flow sheet as applicable)     Details if applicable:    Bike   Standing HS stretch  Incline stretch  SLRx3   43 35 MC BC Totals Reminder: bill using total billable min of TIMED therapeutic procedures (example: do not include dry needle or estim unattended, both untimed codes, in totals to left)  8-22 min = 1 unit; 23-37 min = 2 units; 38-52 min = 3 units; 53-67 min = 4 units; 68-82 min = 5 units   Total Total     [x]  Patient Education billed concurrently with other procedures   [x] Review HEP    [] Progressed/Changed HEP, detail:    [] Other detail:       Objective Information/Functional Measures/Assessment:  Held manual and modalities per patient request  She was able to ascend/descend a flight of stairs with slight pain with eccentric contraction of the L quad  Added lateral step downs for eccentric control/strengthening    Patient will continue to benefit from skilled PT / OT services to modify and progress therapeutic interventions, analyze and address functional mobility deficits, analyze and address ROM deficits, analyze and address strength deficits, analyze and address soft tissue restrictions, and analyze and address imbalance/dizziness to address functional deficits and attain remaining goals.    Progress toward goals / Updated goals:  [x]  See POC   1.   Pt will be able to ambulate with full weight-bearing through the L LE demonstrating improved gait  community distances without brace and AD demonstrating normalized gait pattern.  Status at IE Partial to non weight-bearing on the L with B axillary crutches.  Status at reassessment: pt ambulates into clinic without AD demonstrating reduced knee flexion as brace is locked to 90 deg 2/27/24  Current: she is ambulated into clinic without brace 3.12.24     2.  Pt will improve L knee strength to 5/5 to improve stairs and functional

## 2024-03-26 ENCOUNTER — HOSPITAL ENCOUNTER (OUTPATIENT)
Facility: HOSPITAL | Age: 59
Setting detail: RECURRING SERIES
Discharge: HOME | End: 2024-03-29
Payer: MEDICARE

## 2024-03-26 PROCEDURE — 97110 THERAPEUTIC EXERCISES: CPT | Performed by: PHYSICAL THERAPIST

## 2024-03-26 PROCEDURE — 97530 THERAPEUTIC ACTIVITIES: CPT | Performed by: PHYSICAL THERAPIST

## 2024-03-26 PROCEDURE — 97140 MANUAL THERAPY 1/> REGIONS: CPT | Performed by: PHYSICAL THERAPIST

## 2024-03-26 NOTE — PROGRESS NOTES
PHYSICAL / OCCUPATIONAL THERAPY - DAILY TREATMENT NOTE (updated )  For Eval visit    Patient Name: Shila Montero    Date: 3/26/2024    : 1965  Insurance: Payor: MEDICARE / Plan: MEDICARE PART A AND B / Product Type: *No Product type* /      Patient  verified yes     Visit #   Current / Total 26 36   Time   In / Out 1100 1155   Total Treatment  Time  55   Pain   In / Out 1-2 1   Subjective Functional Status/Changes: Pt reports that she still has pain and a click with bending her L knee. She still has soreness in the L knee.      NEXT PROGRESS NOTE DUE: 3/29/24    TREATMENT AREA =  Left knee pain [M25.562]      OBJECTIVE    Modalities Rationale:     decrease edema, decrease inflammation, and decrease pain to improve patient's ability to progress to PLOF and address remaining functional goals.    PD min [x] Estim Unattended, type/location: IFC to the L knee with ice in supine                                    []  w/ice    []  w/heat    min [] Estim Attended, type/location:                                     []  w/US     []  w/ice    []  w/heat    []  TENS insruct      min []  Mechanical Traction: type/lbs                   []  pro   []  sup   []  int   []  cont    []  before manual    []  after manual   H min [x]  Ultrasound, settings/location:  1.0 W/cm2, 1.0% to the L medial knee    min []  Iontophoresis w/ dexamethasone, location:                                               []  take home patch       []  in clinic   H min  unbilled [x]  Ice     []  Heat    location/position:  Supine to the L knee     min []  Paraffin,  details:     min [x]  Vasopneumatic Device, press/temp:     min []  Whirlpool / Fluido:    If using vaso (only need to measure limb vaso being performed on)      pre-treatment girth :       post-treatment girth :       measured at (landmark location) :     PD min []  Other:  Cold laser 8J/cm2 to the incisions of the L knee to break up scar tissue and promote heeling to reduce

## 2024-03-28 ENCOUNTER — HOSPITAL ENCOUNTER (OUTPATIENT)
Facility: HOSPITAL | Age: 59
Setting detail: RECURRING SERIES
Discharge: HOME | End: 2024-03-31
Payer: MEDICARE

## 2024-03-28 PROCEDURE — 97110 THERAPEUTIC EXERCISES: CPT | Performed by: PHYSICAL THERAPIST

## 2024-03-28 NOTE — PROGRESS NOTES
PHYSICAL / OCCUPATIONAL THERAPY - DAILY TREATMENT NOTE (updated )  For Eval visit    Patient Name: Shila Montero    Date: 3/28/2024    : 1965  Insurance: Payor: MEDICARE / Plan: MEDICARE PART A AND B / Product Type: *No Product type* /      Patient  verified yes     Visit #   Current / Total 27 36   Time   In / Out 1100 1148   Total Treatment  Time  48   Pain   In / Out 1-2 1   Subjective Functional Status/Changes: Pt reports 98% improvement since SOC. She reports continued pain in the L knee with full total knee extension, kneeling on her L knee, and prolonged activity. She reports improvements in all ROM and strength/stability.      NEXT PROGRESS NOTE DUE: 3/29/24    TREATMENT AREA =  Left knee pain [M25.562]      OBJECTIVE    Modalities Rationale:     decrease edema, decrease inflammation, and decrease pain to improve patient's ability to progress to PLOF and address remaining functional goals.    PD min [x] Estim Unattended, type/location: IFC to the L knee with ice in supine                                    []  w/ice    []  w/heat    min [] Estim Attended, type/location:                                     []  w/US     []  w/ice    []  w/heat    []  TENS insruct      min []  Mechanical Traction: type/lbs                   []  pro   []  sup   []  int   []  cont    []  before manual    []  after manual   H min [x]  Ultrasound, settings/location:  1.0 W/cm2, 1.0% to the L medial knee    min []  Iontophoresis w/ dexamethasone, location:                                               []  take home patch       []  in clinic   H min  unbilled [x]  Ice     []  Heat    location/position:  Supine to the L knee     min []  Paraffin,  details:     min [x]  Vasopneumatic Device, press/temp:     min []  Whirlpool / Fluido:    If using vaso (only need to measure limb vaso being performed on)      pre-treatment girth :       post-treatment girth :       measured at (landmark location) :     PD min []

## 2024-03-28 NOTE — PROGRESS NOTES
[facility]  [address], [place] [address], Phone [phone], fax [phone]  CONTINUED PLAN OF CARE/RECERTIFICATION FOR PHYSICAL THERAPY          Patient Name: Shila Montero : 1965   Treatment/Medical Diagnosis: Left knee pain [M25.562]   Onset Date: DOS 24    Referral Source: Sierra Brandon PA Start of Care (SOC): 24   Prior Hospitalization: See Medical History Provider #: [id number]   Prior Level of Function: IND   Comorbidities: anxiety/panic disorders, TBI, depresison, headaches, Rotator cuff surgery      Visits from SOC: 27 Missed Visits: 0     Progress to Goals:   1.   Pt will be able to ambulate with full weight-bearing through the L LE demonstrating improved gait  community distances without brace and AD demonstrating normalized gait pattern.  Status at IE Partial to non weight-bearing on the L with B axillary crutches.  Status at reassessment: pt ambulates into clinic without AD demonstrating reduced knee flexion as brace is locked to 90 deg 24  Current: she is ambulated into clinic without brace demonstrating normalized gait pattern      2.  Pt will improve L knee strength to 5/5 to improve stairs and functional squat without pain.   Status at IE NT per MD protocol  Status at reassessment:   Current:4+/5 3/28/24     3.  Pt will improve FOTO score to at least 57/100 as a functional indicator of improved mobility.  Status at IE 26/100  Status at reassessment: 57/100  Current: 72/100 goal met 3/28/24     4. Pt will be able to ascend/descend 4 therapy stairs without pain in the L knee to return to PLOF symptoms.   Status at reassessment (24) She is able to ascend/descend 4 therapy steps 6\" however, has increased pain with ascending in the patellar tendon   Current: reciprocal pattern with improved eccentric control with pain 3/28/24    Key Functional Changes/Progress: Upon re-evaluation, pt

## 2024-04-02 ENCOUNTER — HOSPITAL ENCOUNTER (OUTPATIENT)
Facility: HOSPITAL | Age: 59
Setting detail: RECURRING SERIES
Discharge: HOME | End: 2024-04-05
Payer: MEDICARE

## 2024-04-02 PROCEDURE — 97110 THERAPEUTIC EXERCISES: CPT | Performed by: PHYSICAL THERAPIST

## 2024-04-02 PROCEDURE — 97140 MANUAL THERAPY 1/> REGIONS: CPT | Performed by: PHYSICAL THERAPIST

## 2024-04-02 NOTE — PROGRESS NOTES
PHYSICAL / OCCUPATIONAL THERAPY - DAILY TREATMENT NOTE (updated )  For Eval visit    Patient Name: Shila Montero    Date: 2024    : 1965  Insurance: Payor: MEDICARE / Plan: MEDICARE PART A AND B / Product Type: *No Product type* /      Patient  verified yes     Visit #   Current / Total 1 24   Time   In / Out 1100 1155   Total Treatment  Time  55   Pain   In / Out 2-3 2   Subjective Functional Status/Changes: Pt reports that she is having more pain today. She attributes it to the weather. She is also not feeling that well and therefore, does not want to try light plymometric activities.      NEXT PROGRESS NOTE DUE: 3/29/24    TREATMENT AREA =  Left knee pain [M25.562]      OBJECTIVE    Modalities Rationale:     decrease edema, decrease inflammation, and decrease pain to improve patient's ability to progress to PLOF and address remaining functional goals.    PD min [x] Estim Unattended, type/location: IFC to the L knee with ice in supine                                    []  w/ice    []  w/heat    min [] Estim Attended, type/location:                                     []  w/US     []  w/ice    []  w/heat    []  TENS insruct      min []  Mechanical Traction: type/lbs                   []  pro   []  sup   []  int   []  cont    []  before manual    []  after manual   H min [x]  Ultrasound, settings/location:  1.0 W/cm2, 1.0% to the L medial knee    min []  Iontophoresis w/ dexamethasone, location:                                               []  take home patch       []  in clinic   H min  unbilled [x]  Ice     []  Heat    location/position:  Supine to the L knee     min []  Paraffin,  details:     min [x]  Vasopneumatic Device, press/temp:     min []  Whirlpool / Fluido:    If using vaso (only need to measure limb vaso being performed on)      pre-treatment girth :       post-treatment girth :       measured at (landmark location) :     PD min []  Other:  Cold laser 8J/cm2 to the incisions of

## 2024-04-04 ENCOUNTER — APPOINTMENT (OUTPATIENT)
Facility: HOSPITAL | Age: 59
End: 2024-04-04
Payer: MEDICARE

## 2024-04-09 ENCOUNTER — HOSPITAL ENCOUNTER (OUTPATIENT)
Facility: HOSPITAL | Age: 59
Setting detail: RECURRING SERIES
Discharge: HOME | End: 2024-04-12
Payer: MEDICARE

## 2024-04-09 PROCEDURE — 97140 MANUAL THERAPY 1/> REGIONS: CPT | Performed by: PHYSICAL THERAPIST

## 2024-04-09 PROCEDURE — 97530 THERAPEUTIC ACTIVITIES: CPT | Performed by: PHYSICAL THERAPIST

## 2024-04-09 PROCEDURE — 97110 THERAPEUTIC EXERCISES: CPT | Performed by: PHYSICAL THERAPIST

## 2024-04-09 NOTE — PROGRESS NOTES
applicable):    [x]  intact    []  redness- no adverse reaction                 []redness - adverse reaction:      Vasopnuematic compression justification:  Per bilateral girth measures taken and listed above the edema is considered significant and having an impact on the patient's strength, balance, gait, transfers, self care, and ADL's       Therapeutic Procedures:  Tx Min Billable or 1:1 Min (if diff from Tx Min) Procedure, Rationale, Specifics   10  94995 Manual Therapy (timed):  decrease pain, increase ROM, increase tissue extensibility, and bandage removal to improve patient's ability to progress to PLOF and address remaining functional goals.  The manual therapy interventions were performed at a separate and distinct time from the therapeutic activities interventions . (see flow sheet as applicable)     Details if applicable:    DTM to the lateral quad on the L with knee in flexion and extension  With graston tool 4       46672 Neuromuscular Re-Education (timed):  improve balance, coordination, kinesthetic sense, posture, core stability and proprioception to improve patient's ability to develop conscious control of individual muscles and awareness of position of extremities in order to progress to PLOF and address remaining functional goals. (see flow sheet as applicable)        27  07990 Therapeutic Activity (timed):  use of dynamic activities replicating functional movements to increase ROM, strength, coordination, balance, and proprioception in order to improve patient's ability to progress to PLOF and address remaining functional goals.  (see flow sheet as applicable)        Squatting activities  Quick step ups  Wall jumps  SL bridges  Glut bridges with EasilyDo   10   78302 Therapeutic Exercise (timed):  increase ROM, strength, coordination, balance, and proprioception to improve patient's ability to progress to PLOF and address remaining functional goals. (see flow sheet as applicable)     Details if

## 2024-04-11 ENCOUNTER — HOSPITAL ENCOUNTER (OUTPATIENT)
Facility: HOSPITAL | Age: 59
Setting detail: RECURRING SERIES
Discharge: HOME | End: 2024-04-14
Payer: MEDICARE

## 2024-04-11 PROCEDURE — G0283 ELEC STIM OTHER THAN WOUND: HCPCS | Performed by: PHYSICAL THERAPIST

## 2024-04-11 PROCEDURE — 97110 THERAPEUTIC EXERCISES: CPT | Performed by: PHYSICAL THERAPIST

## 2024-04-11 PROCEDURE — 97530 THERAPEUTIC ACTIVITIES: CPT | Performed by: PHYSICAL THERAPIST

## 2024-04-11 PROCEDURE — 97140 MANUAL THERAPY 1/> REGIONS: CPT | Performed by: PHYSICAL THERAPIST

## 2024-04-11 NOTE — PROGRESS NOTES
as applicable)     Details if applicable:    Bike   Standing HS stretch  Incline stretch       54  [facility] Totals Reminder: bill using total billable min of TIMED therapeutic procedures (example: do not include dry needle or estim unattended, both untimed codes, in totals to left)  8-22 min = 1 unit; 23-37 min = 2 units; 38-52 min = 3 units; 53-67 min = 4 units; 68-82 min = 5 units   Total Total     [x]  Patient Education billed concurrently with other procedures   [x] Review HEP    [] Progressed/Changed HEP, detail:    [] Other detail:       Objective Information/Functional Measures/Assessment:  Held plyometric activities secondary to increased soreness.  Focused treatment on functional quad strength  Increased pain over lateral L knee incision- performed scar massage  Pt reports increased soreness post treamtent- applied IFC for pain and swelling  Continue to progress as tolerated    Patient will continue to benefit from skilled PT / OT services to modify and progress therapeutic interventions, analyze and address functional mobility deficits, analyze and address ROM deficits, analyze and address strength deficits, analyze and address soft tissue restrictions, and analyze and address imbalance/dizziness to address functional deficits and attain remaining goals.    Progress toward goals / Updated goals:  [x]  See POC  Pt will improve L LE strength within 75% of the R noted with ACL hop tests to return to running.   Status at re-cert: NT as she was not 12 weeks  Pt will improve gross L knee strength to 5/5 to be able to descend stairs without restrictions.  Status at re-cert: 4+/5 HS   Pt will improve B hip strength to 5/5 to improve gait stability.   Status at re-cert: flexion 4+/5, abduction: 4/5, extension 4+/5   Pt will be able to ambulate and perform basic ADLs without pain in the L knee.  Status at re-cert: 1-2 pain    PLAN  yes Continue plan of care  [x]  Upgrade activities as tolerated  []  Discharge due to

## 2024-04-16 ENCOUNTER — HOSPITAL ENCOUNTER (OUTPATIENT)
Facility: HOSPITAL | Age: 59
Setting detail: RECURRING SERIES
Discharge: HOME | End: 2024-04-19
Payer: MEDICARE

## 2024-04-16 PROCEDURE — 97110 THERAPEUTIC EXERCISES: CPT | Performed by: PHYSICAL THERAPIST

## 2024-04-16 PROCEDURE — 97140 MANUAL THERAPY 1/> REGIONS: CPT | Performed by: PHYSICAL THERAPIST

## 2024-04-16 PROCEDURE — 97530 THERAPEUTIC ACTIVITIES: CPT | Performed by: PHYSICAL THERAPIST

## 2024-04-16 PROCEDURE — 97035 APP MDLTY 1+ULTRASOUND EA 15: CPT | Performed by: PHYSICAL THERAPIST

## 2024-04-16 NOTE — PROGRESS NOTES
PHYSICAL / OCCUPATIONAL THERAPY - DAILY TREATMENT NOTE (updated )  For Eval visit    Patient Name: Shila Montero    Date: 2024    : 1965  Insurance: Payor: MEDICARE / Plan: MEDICARE PART A AND B / Product Type: *No Product type* /      Patient  verified yes     Visit #   Current / Total 3 24   Time   In / Out 902 1005   Total Treatment  Time  63   Pain   In / Out 1-2 0   Subjective Functional Status/Changes: Pt reports that she was sore after last session due to the increased program. She notes that it did not last long.      NEXT PROGRESS NOTE DUE: 24    TREATMENT AREA =  Left knee pain [M25.562]      OBJECTIVE    Modalities Rationale:     decrease edema, decrease inflammation, and decrease pain to improve patient's ability to progress to PLOF and address remaining functional goals.     min [] Estim Unattended, type/location:                                     []  w/ice    []  w/heat    min [] Estim Attended, type/location:                                     []  w/US     []  w/ice    []  w/heat    []  TENS insruct      min []  Mechanical Traction: type/lbs                   []  pro   []  sup   []  int   []  cont    []  before manual    []  after manual   8 min [x]  Ultrasound, settings/location:  1.0 W/cm2, 1.0% to the L medial knee    min []  Iontophoresis w/ dexamethasone, location:                                               []  take home patch       []  in clinic   10 min  unbilled [x]  Ice     []  Heat    location/position:  Supine to the L knee     min []  Paraffin,  details:     min [x]  Vasopneumatic Device, press/temp:     min []  Whirlpool / Fluido:    If using vaso (only need to measure limb vaso being performed on)      pre-treatment girth :       post-treatment girth :       measured at (landmark location) :     PD min []  Other:  Cold laser 8J/cm2 to the incisions of the L knee to break up scar tissue and promote heeling to reduce pain   Skin assessment post-treatment

## 2024-04-18 ENCOUNTER — HOSPITAL ENCOUNTER (OUTPATIENT)
Facility: HOSPITAL | Age: 59
Setting detail: RECURRING SERIES
Discharge: HOME | End: 2024-04-21
Payer: MEDICARE

## 2024-04-18 PROCEDURE — 97530 THERAPEUTIC ACTIVITIES: CPT | Performed by: PHYSICAL THERAPIST

## 2024-04-18 PROCEDURE — 97140 MANUAL THERAPY 1/> REGIONS: CPT | Performed by: PHYSICAL THERAPIST

## 2024-04-18 PROCEDURE — 97035 APP MDLTY 1+ULTRASOUND EA 15: CPT | Performed by: PHYSICAL THERAPIST

## 2024-04-18 NOTE — PROGRESS NOTES
Standing HS stretch  Incline stretch       47+ 8 US=55  45 [facility] Totals Reminder: bill using total billable min of TIMED therapeutic procedures (example: do not include dry needle or estim unattended, both untimed codes, in totals to left)  8-22 min = 1 unit; 23-37 min = 2 units; 38-52 min = 3 units; 53-67 min = 4 units; 68-82 min = 5 units   Total Total     [x]  Patient Education billed concurrently with other procedures   [x] Review HEP    [] Progressed/Changed HEP, detail:    [] Other detail:       Objective Information/Functional Measures/Assessment:  Pt continues to have increased pain over the lateral incision limiting tolerance to standing activities  Pt reports improvements in pain and scar tissue with using US first then performing manual  Pt challenged and spasms in L hamstrings with SB HS curls  Continue to progress as tolerated    Patient will continue to benefit from skilled PT / OT services to modify and progress therapeutic interventions, analyze and address functional mobility deficits, analyze and address ROM deficits, analyze and address strength deficits, analyze and address soft tissue restrictions, and analyze and address imbalance/dizziness to address functional deficits and attain remaining goals.    Progress toward goals / Updated goals:  [x]  See POC  Pt will improve L LE strength within 75% of the R noted with ACL hop tests to return to running.   Status at re-cert: NT as she was not 12 weeks  Pt will improve gross L knee strength to 5/5 to be able to descend stairs without restrictions.  Status at re-cert: 4+/5 HS   Pt will improve B hip strength to 5/5 to improve gait stability.   Status at re-cert: flexion 4+/5, abduction: 4/5, extension 4+/5   Pt will be able to ambulate and perform basic ADLs without pain in the L knee.  Status at re-cert: 1-2 pain  Current: 1-2/10 pain 4/16/24    PLAN  yes Continue plan of care  [x]  Upgrade activities as tolerated  []  Discharge due to :  [x]

## 2024-04-23 ENCOUNTER — HOSPITAL ENCOUNTER (OUTPATIENT)
Facility: HOSPITAL | Age: 59
Setting detail: RECURRING SERIES
Discharge: HOME | End: 2024-04-26
Payer: MEDICARE

## 2024-04-23 PROCEDURE — 97530 THERAPEUTIC ACTIVITIES: CPT

## 2024-04-23 PROCEDURE — 97140 MANUAL THERAPY 1/> REGIONS: CPT

## 2024-04-23 NOTE — PROGRESS NOTES
PHYSICAL / OCCUPATIONAL THERAPY - DAILY TREATMENT NOTE (updated )  For Eval visit    Patient Name: Shila Montero    Date: 2024    : 1965  Insurance: Payor: MEDICARE / Plan: MEDICARE PART A AND B / Product Type: *No Product type* /      Patient  verified yes     Visit #   Current / Total 6 24   Time   In / Out 11:00 12:02   Total Treatment  Time  62   Pain   In / Out 3-4/10 sore  -2/10   Subjective Functional Status/Changes: I stood a lot and was was over all up on my leg a lot this past weekend at my show and with the rain it really made my knee ache and become more sore      NEXT PROGRESS NOTE DUE: 24    TREATMENT AREA =  Left knee pain [M25.562]      OBJECTIVE    Modalities Rationale:     decrease edema, decrease inflammation, and decrease pain to improve patient's ability to progress to PLOF and address remaining functional goals.     min [] Estim Unattended, type/location:                                     []  w/ice    []  w/heat    min [] Estim Attended, type/location:                                     []  w/US     []  w/ice    []  w/heat    []  TENS insruct      min []  Mechanical Traction: type/lbs                   []  pro   []  sup   []  int   []  cont    []  before manual    []  after manual   X min [x]  Ultrasound, settings/location:  1.0 W/cm2, 1.0% to the L medial knee    min []  Iontophoresis w/ dexamethasone, location:                                               []  take home patch       []  in clinic   10 min  unbilled []  Ice     [x]  Heat    location/position:  Long sitting with bolster under knee - MH anterior     min []  Paraffin,  details:     min [x]  Vasopneumatic Device, press/temp:     min []  Whirlpool / Fluido:    If using vaso (only need to measure limb vaso being performed on)      pre-treatment girth :       post-treatment girth :       measured at (landmark location) :     PD min []  Other:  Cold laser 8J/cm2 to the incisions of the L knee to break

## 2024-04-25 ENCOUNTER — HOSPITAL ENCOUNTER (OUTPATIENT)
Facility: HOSPITAL | Age: 59
Setting detail: RECURRING SERIES
Discharge: HOME | End: 2024-04-28
Payer: MEDICARE

## 2024-04-25 PROCEDURE — 97530 THERAPEUTIC ACTIVITIES: CPT | Performed by: PHYSICAL THERAPIST

## 2024-04-25 PROCEDURE — 97110 THERAPEUTIC EXERCISES: CPT | Performed by: PHYSICAL THERAPIST

## 2024-04-26 NOTE — PROGRESS NOTES
PHYSICAL / OCCUPATIONAL THERAPY - DAILY TREATMENT NOTE (updated )  For Eval visit    Patient Name: Shila Montero    Date: 2024    : 1965  Insurance: Payor: MEDICARE / Plan: MEDICARE PART A AND B / Product Type: *No Product type* /      Patient  verified yes     Visit #   Current / Total 7 24   Time   In / Out 11:05 1145   Total Treatment  Time  40   Pain   In / Out 3-4/10 sore  3/10   Subjective Functional Status/Changes: Pt reports 95% improvement since SOC. She reports improvement in ability to walk, bend her knee, and functional mobility. Functional limitations include prolonged standing, walking > 1.5 miles without compensation, and full bend of he L knee, and applying pressure on her knee.     NEXT PROGRESS NOTE DUE: 24    TREATMENT AREA =  Left knee pain [M25.562]      OBJECTIVE    Modalities Rationale:     decrease edema, decrease inflammation, and decrease pain to improve patient's ability to progress to PLOF and address remaining functional goals.     min [] Estim Unattended, type/location:                                     []  w/ice    []  w/heat    min [] Estim Attended, type/location:                                     []  w/US     []  w/ice    []  w/heat    []  TENS insruct      min []  Mechanical Traction: type/lbs                   []  pro   []  sup   []  int   []  cont    []  before manual    []  after manual   X min [x]  Ultrasound, settings/location:  1.0 W/cm2, 1.0% to the L medial knee    min []  Iontophoresis w/ dexamethasone, location:                                               []  take home patch       []  in clinic   PD min  unbilled []  Ice     [x]  Heat    location/position:  Long sitting with bolster under knee - MH anterior     min []  Paraffin,  details:     min [x]  Vasopneumatic Device, press/temp:     min []  Whirlpool / Fluido:    If using vaso (only need to measure limb vaso being performed on)      pre-treatment girth :       post-treatment girth 
**  [facility]  Thank you

## 2024-05-02 ENCOUNTER — HOSPITAL ENCOUNTER (OUTPATIENT)
Facility: HOSPITAL | Age: 59
Setting detail: RECURRING SERIES
Discharge: HOME | End: 2024-05-05
Payer: MEDICARE

## 2024-05-02 PROCEDURE — 97110 THERAPEUTIC EXERCISES: CPT | Performed by: GENERAL ACUTE CARE HOSPITAL

## 2024-05-02 PROCEDURE — 97530 THERAPEUTIC ACTIVITIES: CPT | Performed by: GENERAL ACUTE CARE HOSPITAL

## 2024-05-02 NOTE — PROGRESS NOTES
PHYSICAL / OCCUPATIONAL THERAPY - DAILY TREATMENT NOTE (updated )  For Eval visit    Patient Name: Shila Montero    Date: 2024    : 1965  Insurance: Payor: MEDICARE / Plan: MEDICARE PART A AND B / Product Type: *No Product type* /      Patient  verified yes     Visit #   Current / Total 8 24   Time   In / Out 1140  1220    Total Treatment  Time  40    Pain   In / Out 2-3  3    Subjective Functional Status/Changes: Patient states she rode her bike the other day.      NEXT PROGRESS NOTE DUE: 2024    TREATMENT AREA =  Left knee pain [M25.562]      OBJECTIVE    Modalities Rationale:     decrease edema, decrease inflammation, and decrease pain to improve patient's ability to progress to PLOF and address remaining functional goals.     min [] Estim Unattended, type/location:                                     []  w/ice    []  w/heat    min [] Estim Attended, type/location:                                     []  w/US     []  w/ice    []  w/heat    []  TENS insruct      min []  Mechanical Traction: type/lbs                   []  pro   []  sup   []  int   []  cont    []  before manual    []  after manual   X min [x]  Ultrasound, settings/location:  1.0 W/cm2, 1.0% to the L medial knee    min []  Iontophoresis w/ dexamethasone, location:                                               []  take home patch       []  in clinic   PD min  unbilled []  Ice     [x]  Heat    location/position:  Long sitting with bolster under knee - MH anterior     min []  Paraffin,  details:     min [x]  Vasopneumatic Device, press/temp:     min []  Whirlpool / Fluido:    If using vaso (only need to measure limb vaso being performed on)      pre-treatment girth :       post-treatment girth :       measured at (landmark location) :     PD min []  Other:  Cold laser 8J/cm2 to the incisions of the L knee to break up scar tissue and promote heeling to reduce pain   Skin assessment post-treatment (if applicable):    [x]

## 2024-05-07 ENCOUNTER — APPOINTMENT (OUTPATIENT)
Facility: HOSPITAL | Age: 59
End: 2024-05-07
Payer: MEDICARE

## 2024-05-10 ENCOUNTER — HOSPITAL ENCOUNTER (OUTPATIENT)
Facility: HOSPITAL | Age: 59
Setting detail: RECURRING SERIES
Discharge: HOME | End: 2024-05-13
Payer: MEDICARE

## 2024-05-10 PROCEDURE — 97530 THERAPEUTIC ACTIVITIES: CPT | Performed by: PHYSICAL THERAPIST

## 2024-05-10 PROCEDURE — 97110 THERAPEUTIC EXERCISES: CPT | Performed by: PHYSICAL THERAPIST

## 2024-05-10 NOTE — PROGRESS NOTES
[place]   5/16/2024 12:20 PM Laura Humphreys, PT [facility]   5/23/2024 10:20 AM Laura Humphreys, PT [facility]   5/30/2024 10:20 AM Laura Humphreys, PT [facility]

## 2024-05-14 ENCOUNTER — APPOINTMENT (OUTPATIENT)
Facility: HOSPITAL | Age: 59
End: 2024-05-14
Payer: MEDICARE

## 2024-05-16 ENCOUNTER — HOSPITAL ENCOUNTER (OUTPATIENT)
Facility: HOSPITAL | Age: 59
Setting detail: RECURRING SERIES
Discharge: HOME | End: 2024-05-19
Payer: MEDICARE

## 2024-05-16 PROCEDURE — 97110 THERAPEUTIC EXERCISES: CPT | Performed by: PHYSICAL THERAPIST

## 2024-05-16 PROCEDURE — 97530 THERAPEUTIC ACTIVITIES: CPT | Performed by: PHYSICAL THERAPIST

## 2024-05-16 PROCEDURE — 97140 MANUAL THERAPY 1/> REGIONS: CPT | Performed by: PHYSICAL THERAPIST

## 2024-05-16 NOTE — PROGRESS NOTES
5/16/2024    12:56 PM    Future Appointments   Date Time Provider Department Center   5/23/2024 10:[facility]   5/30/2024 10:20 AM [facility]

## 2024-05-21 ENCOUNTER — APPOINTMENT (OUTPATIENT)
Facility: HOSPITAL | Age: 59
End: 2024-05-21
Payer: MEDICARE

## 2024-05-23 ENCOUNTER — HOSPITAL ENCOUNTER (OUTPATIENT)
Facility: HOSPITAL | Age: 59
Setting detail: RECURRING SERIES
Discharge: HOME | End: 2024-05-26
Payer: MEDICARE

## 2024-05-23 PROCEDURE — 97110 THERAPEUTIC EXERCISES: CPT | Performed by: PHYSICAL THERAPIST

## 2024-05-23 PROCEDURE — 97530 THERAPEUTIC ACTIVITIES: CPT | Performed by: PHYSICAL THERAPIST

## 2024-05-23 NOTE — PROGRESS NOTES
PHYSICAL / OCCUPATIONAL THERAPY - DAILY TREATMENT NOTE (updated )  For Eval visit    Patient Name: Shila Montero    Date: 2024    : 1965  Insurance: Payor: MEDICARE / Plan: MEDICARE PART A AND B / Product Type: *No Product type* /      Patient  verified yes     Visit #   Current / Total 11 24   Time   In / Out 1020 1110   Total Treatment  Time  50   Pain   In / Out 1-2 1   Subjective Functional Status/Changes: PT reports 95% improvement of symptoms since SOC. She reports improvements in overall mobility, strength and stability. She reports that she is able to do stairs and do ladders. She was able to walk 3 miles yesterday without difficulty. She reports her functional limitations include standing/walking fast.      NEXT PROGRESS NOTE DUE: 2024    TREATMENT AREA =  Left knee pain [M25.562]      OBJECTIVE    Modalities Rationale:     decrease edema, decrease inflammation, and decrease pain to improve patient's ability to progress to PLOF and address remaining functional goals.     min [] Estim Unattended, type/location:                                     []  w/ice    []  w/heat    min [] Estim Attended, type/location:                                     []  w/US     []  w/ice    []  w/heat    []  TENS insruct      min []  Mechanical Traction: type/lbs                   []  pro   []  sup   []  int   []  cont    []  before manual    []  after manual   X min [x]  Ultrasound, settings/location:  1.0 W/cm2, 1.0% to the L medial knee    min []  Iontophoresis w/ dexamethasone, location:                                               []  take home patch       []  in clinic   PD min  unbilled []  Ice     [x]  Heat    location/position:  Long sitting with bolster under knee - MH anterior     min []  Paraffin,  details:     min [x]  Vasopneumatic Device, press/temp:     min []  Whirlpool / Fluido:    If using vaso (only need to measure limb vaso being performed on)      pre-treatment girth :

## 2024-05-23 NOTE — PROGRESS NOTES
[facility]  [address] [place] [address]  Phone: [phone]   [phone]  PHYSICAL THERAPY PROGRESS NOTE  Patient Name: Shila Montero : 1965   Treatment/Medical Diagnosis: Left knee pain [M25.562]   Referral Source: Sierra Brandon PA     Date of Initial Visit: 24 Attended Visits: 38 Missed Visits: 1     SUMMARY OF TREATMENT  PT seen for 38 PT visits sp L ACL repair on 24. Therapy has closely followed MD protocol.     CURRENT STATUS  PT reports 95% improvement of symptoms since SOC. She reports improvements in overall mobility, strength and stability. She reports that she is able to do stairs and do ladders. She was able to walk 3 miles yesterday without difficulty. She reports her functional limitations include standing/walking fast. Upon reassessment, pt presents with improvements in L knee mobility and strength. She continues to report pain over distal incision mostly likely due to scar tissue which she is IND able to perform self massage to assist with break down. Due to pain levels, gentle plyometric assessment has been held at this time. She would benefit from continued therapy to progress Dynamic stability to return to sporting activities without risk of re-injury.     Objective Information/Functional Measures/Assessment:  FOTO remained at 67/100  Gross L knee strength is 5/5  Increased pain with B LE jumping activities  Continues to have pain with ambulation 2-3 miles but can do it.     PROGRESS TOWARDS GOALS:  LONG-TERM GOALS TO BE ACHIEVED IN 17 TREATMENTS:   Pt will improve L LE strength within 75% of the R noted with ACL hop tests to return to running.   Status at re-cert: NT as she was not 12 weeks  Current:  pain with B hopping 24     Pt will improve gross L knee strength to 5/5 to be able to descend stairs without restrictions.  Status at re-cert: 4+/5 HS   Current:  goal met 24

## 2024-05-28 ENCOUNTER — APPOINTMENT (OUTPATIENT)
Facility: HOSPITAL | Age: 59
End: 2024-05-28
Payer: MEDICARE

## 2024-05-29 ENCOUNTER — HOSPITAL ENCOUNTER (OUTPATIENT)
Facility: HOSPITAL | Age: 59
Setting detail: RECURRING SERIES
Discharge: HOME | End: 2024-06-01
Payer: MEDICARE

## 2024-05-29 PROCEDURE — 97140 MANUAL THERAPY 1/> REGIONS: CPT | Performed by: PHYSICAL THERAPIST

## 2024-05-29 NOTE — PROGRESS NOTES
PHYSICAL / OCCUPATIONAL THERAPY - DAILY TREATMENT NOTE (updated )  For Eval visit    Patient Name: Shila Montero    Date: 2024    : 1965  Insurance: Payor: MEDICARE / Plan: MEDICARE PART A AND B / Product Type: *No Product type* /      Patient  verified yes     Visit #   Current / Total 12 24   Time   In / Out 1021 1103   Total Treatment  Time  42   Pain   In / Out 1-2 0   Subjective Functional Status/Changes: Pt reports that she saw MD and she was released. She continues to have pain because she needs to work the scar tissue.      NEXT PROGRESS NOTE DUE: 24    TREATMENT AREA =  Left knee pain [M25.562]      OBJECTIVE    Modalities Rationale:     decrease edema, decrease inflammation, and decrease pain to improve patient's ability to progress to PLOF and address remaining functional goals.     min [] Estim Unattended, type/location:                                     []  w/ice    []  w/heat    min [] Estim Attended, type/location:                                     []  w/US     []  w/ice    []  w/heat    []  TENS insruct      min []  Mechanical Traction: type/lbs                   []  pro   []  sup   []  int   []  cont    []  before manual    []  after manual   X min [x]  Ultrasound, settings/location:  1.0 W/cm2, 1.0% to the L medial knee    min []  Iontophoresis w/ dexamethasone, location:                                               []  take home patch       []  in clinic   PD min  unbilled []  Ice     [x]  Heat    location/position:  Long sitting with bolster under knee - MH anterior     min []  Paraffin,  details:     min [x]  Vasopneumatic Device, press/temp:     min []  Whirlpool / Fluido:    If using vaso (only need to measure limb vaso being performed on)      pre-treatment girth :       post-treatment girth :       measured at (landmark location) :     PD min []  Other:  Cold laser 8J/cm2 to the incisions of the L knee to break up scar tissue and promote heeling to reduce

## 2024-05-30 ENCOUNTER — APPOINTMENT (OUTPATIENT)
Facility: HOSPITAL | Age: 59
End: 2024-05-30
Payer: MEDICARE